# Patient Record
Sex: FEMALE | Employment: UNEMPLOYED | URBAN - METROPOLITAN AREA
[De-identification: names, ages, dates, MRNs, and addresses within clinical notes are randomized per-mention and may not be internally consistent; named-entity substitution may affect disease eponyms.]

---

## 2021-10-05 ENCOUNTER — INITIAL CONSULT (OUTPATIENT)
Dept: PAIN MANAGEMENT | Age: 71
End: 2021-10-05
Payer: MEDICARE

## 2021-10-05 VITALS — WEIGHT: 185 LBS | HEIGHT: 66 IN | TEMPERATURE: 97.6 F | BODY MASS INDEX: 29.73 KG/M2

## 2021-10-05 DIAGNOSIS — R06.83 SNORES: ICD-10-CM

## 2021-10-05 DIAGNOSIS — G89.29 CHRONIC LEFT-SIDED LOW BACK PAIN WITHOUT SCIATICA: Primary | ICD-10-CM

## 2021-10-05 DIAGNOSIS — M79.605 LEFT LEG PAIN: ICD-10-CM

## 2021-10-05 DIAGNOSIS — Z98.890 HX OF LUMBOSACRAL SPINE SURGERY: ICD-10-CM

## 2021-10-05 DIAGNOSIS — M46.1 SACROILIITIS (HCC): ICD-10-CM

## 2021-10-05 DIAGNOSIS — M54.50 CHRONIC LEFT-SIDED LOW BACK PAIN WITHOUT SCIATICA: Primary | ICD-10-CM

## 2021-10-05 DIAGNOSIS — M99.04 SOMATIC DYSFUNCTION OF SACROILIAC JOINT: ICD-10-CM

## 2021-10-05 PROCEDURE — 99204 OFFICE O/P NEW MOD 45 MIN: CPT | Performed by: PHYSICAL MEDICINE & REHABILITATION

## 2021-10-05 PROCEDURE — G8419 CALC BMI OUT NRM PARAM NOF/U: HCPCS | Performed by: PHYSICAL MEDICINE & REHABILITATION

## 2021-10-05 PROCEDURE — 3017F COLORECTAL CA SCREEN DOC REV: CPT | Performed by: PHYSICAL MEDICINE & REHABILITATION

## 2021-10-05 PROCEDURE — G8484 FLU IMMUNIZE NO ADMIN: HCPCS | Performed by: PHYSICAL MEDICINE & REHABILITATION

## 2021-10-05 PROCEDURE — 1036F TOBACCO NON-USER: CPT | Performed by: PHYSICAL MEDICINE & REHABILITATION

## 2021-10-05 PROCEDURE — G8427 DOCREV CUR MEDS BY ELIG CLIN: HCPCS | Performed by: PHYSICAL MEDICINE & REHABILITATION

## 2021-10-05 PROCEDURE — 1123F ACP DISCUSS/DSCN MKR DOCD: CPT | Performed by: PHYSICAL MEDICINE & REHABILITATION

## 2021-10-05 PROCEDURE — 1090F PRES/ABSN URINE INCON ASSESS: CPT | Performed by: PHYSICAL MEDICINE & REHABILITATION

## 2021-10-05 PROCEDURE — G8400 PT W/DXA NO RESULTS DOC: HCPCS | Performed by: PHYSICAL MEDICINE & REHABILITATION

## 2021-10-05 PROCEDURE — 4040F PNEUMOC VAC/ADMIN/RCVD: CPT | Performed by: PHYSICAL MEDICINE & REHABILITATION

## 2021-10-05 RX ORDER — TIZANIDINE 2 MG/1
2 TABLET ORAL EVERY EVENING
Qty: 30 TABLET | Refills: 0 | Status: SHIPPED | OUTPATIENT
Start: 2021-10-05 | End: 2021-11-04

## 2021-10-05 RX ORDER — LIDOCAINE 40 MG/G
CREAM TOPICAL
Qty: 45 G | Refills: 0 | Status: SHIPPED | OUTPATIENT
Start: 2021-10-05

## 2021-10-05 RX ORDER — METHYLPREDNISOLONE 4 MG/1
TABLET ORAL
Qty: 1 KIT | Refills: 0 | Status: SHIPPED | OUTPATIENT
Start: 2021-10-05 | End: 2021-10-19

## 2021-10-05 ASSESSMENT — ENCOUNTER SYMPTOMS
DIARRHEA: 0
CONSTIPATION: 0
SHORTNESS OF BREATH: 0
BACK PAIN: 1
NAUSEA: 0

## 2021-10-05 NOTE — PROGRESS NOTES
Aubrie Muñoz  (1950)    10/5/2021    Subjective:     Aubrie Muñoz is 73 y.o. female who complains today of:    Chief Complaint   Patient presents with    Back Pain       Aubrie Muñoz is a 73 y.o. female who presents for evaluation by request of Dr. Dc Mcgraw for low back pain. She has struggled with pain for over 10 months. She denies any immediately-preceding traumatic or inciting events. She has been previously evaluated by Dr Dc Mcgraw whose records are reviewed below. She describes pain located in the left low back, buttock, and left leg. Pain is a constant ache and is currently a 7/10 and gets up to a 10/10 at its worst and goes down to a 6/10 at its best. Pain is worse with bending and walking. Pain is better with laying down. Pain is located 0% on the right and 100% on the left. Pain is located 80% in the back and buttock and 20% in the leg. She denies any numbness, tingling, weakness, bowel or bladder dysfunction, saddle anesthesia, falls, history of cancer, unexplained weight loss, persistent night pain and sweats, fever, IV drug abuse, immunocompromise, chronic prednisone or antibiotic use, or any other red flag symptoms. Mood is good, denies any suicidal or homicidal ideation. Sleep is good, awakes refreshed.     She has tried:  Home exercise program with minimal relief  Chiropractic treatment with Dr Dc Mcgraw with some relief  Lumbar spine surgery L5 with Dr Ck Aguilar 18 years ago    Diagnostic testing previously performed includes XRs    Medications tried include:  Acetaminophen with minimal relief for over 3 months  Ibuprofen with minimal relief for over 3 months    Allergies, Medications, Past Medical History, Family History, Social History, Work History, and Review of Systems reviewed below     +Stroke 18 years ago  +diabetes mellitus   +snores  +Anxiety     No Seizures, Epilepsy or Brain Surgery     Spends her time: retired, 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 \"[facility] is not all that we thought it would be. \"      Allergies:  Meperidine, Sulfamethoxazole-trimethoprim, Statins, and Meperidine hcl    Past Medical History:   Diagnosis Date    Diabetes mellitus ([facility]     Hypertension     Irritable bowel syndrome     Peripheral vascular disease ([facility]      Past Surgical History:   Procedure Laterality Date    CARPAL TUNNEL RELEASE      SHOULDER SURGERY      THYROID SURGERY       Family History   Problem Relation Age of Onset    Arthritis Mother     Heart Disease Mother     Hypertension Father     Stroke Father     Heart Disease Father      Social History     Socioeconomic History    Marital status: Unknown     Spouse name: Not on file    Number of children: Not on file    Years of education: Not on file    Highest education level: Not on file   Occupational History    Not on file   Tobacco Use    Smoking status: Former Smoker     Packs/day: 1.00     Years: 18.00     Pack years: 18.00     Quit date:      Years since quittin.7    Smokeless tobacco: Never Used   Substance and Sexual Activity    Alcohol use: Never    Drug use: Never    Sexual activity: Not on file   Other Topics Concern    Not on file   Social History Narrative    Not on file     Social Determinants of Health     Financial Resource Strain:     Difficulty of Paying Living Expenses:    Food Insecurity:     Worried About Running Out of Food in the Last Year:     [address] in the Last Year:    Transportation Needs:     Lack of Transportation (Medical):      Lack of Transportation (Non-Medical):    Physical Activity:     Days of Exercise per Week:     Minutes of Exercise per Session:    Stress:     Feeling of Stress :    Social Connections:     Frequency of Communication with Friends and Family:     Frequency of Social Gatherings with Friends and Family:     Attends Voodoo Services:     Active Member of Clubs or Organizations:     Attends Club or Organization Meetings:     Marital Status:    Intimate Partner Violence:     Fear of Current or Ex-Partner:     Emotionally Abused:     Physically Abused:     Sexually Abused:        No current outpatient medications on file prior to visit. No current facility-administered medications on file prior to visit. Review of Systems   Constitutional: Negative for fever. HENT: Negative for hearing loss. Respiratory: Negative for shortness of breath. Gastrointestinal: Negative for constipation, diarrhea and nausea. Genitourinary: Negative for difficulty urinating. Musculoskeletal: Positive for back pain. Negative for neck pain. Skin: Negative for rash. Neurological: Negative for headaches. Hematological: Does not bruise/bleed easily. Psychiatric/Behavioral: Negative for sleep disturbance. Objective:     Vitals:  Temp 97.6 °F (36.4 °C)   Ht 5' 6\" (1.676 m)   Wt 185 lb (83.9 kg)   BMI 29.86 kg/m² Pain Score:  10 - Worst pain ever      Exam performed under Coronavirus precautions  Gen: No acute distress  Neck: Grossly symmetric without any significant thyromegaly or masses appreciated. Eyes: No scleral icterus or lid lag appreciated bilaterally. Irises without gross defects bilaterally. HEENT: Hearing grossly intact bilaterally. Normocephalic, external ears and visible portions of nose and mouth atraumatic. Lymph: No gross neck or axillary lymphadenopathy  Cardio: No significant lower extremity edema, pulses intact without significant digit ischemia. Abd: No gross masses or large hernias appreciated. Skin: Visualized skin without any dermatomal rashes or sores. Palpation free of any tightening or subcutaneous nodules. MSK: Gait is antalgic. No significant upper limb digit ischemia appreciated.   Psych: Pleasant and cooperative with the history and exam. Mood and Affect normal. Appropriately dressed with good eye contact. Judgement and insight normal. Recent and remote memory intact. Alert and Oriented x3. Neuro: Cranial nerves II-XII grossly intact. No significant pathologic reflexes appreciated. Rises from a seated to standing position with mild difficulty. Gait is antalgic. No assistive devices used. Heel and toe walk intact. Lumbar flexion to 40 degrees, extension to 15 degrees. Limited lumbar spine range of motion. Rotation and extension reproduces axial low back pain. Other facet provocative maneuvers are positive. No gross step offs noted. Tenderness to palpation over the mid to low lumbar spinous processes and left lumbar paraspinals from L2 down to the sacrum. tenderness over left PSIS. No tenderness over bilateral greater trochanters. No tenderness over bilateral deep gluteal regions. Sensation grossly intact in both legs except for some left s1 paresthesias  Reflexes and strength functional for ambulation, no abnormal reflexes appreciated on exam today  Strength greater than 3/5 bilateral legs  Straight leg raise negative bilaterally. Positive thigh thrust, yoselin's and compression tests on the left. Outside record review:  Review of the original consultation request reveals no specific diagnostic requests or clinical concerns aside from pain management that require particular attention. There are no suggested, requested, or specified tests to be ordered or any prior diagnostics performed that require follow-up or further investigation.     Dr Izquierdo Medicus notes not avail for review at time of consultation    Labs 3/23/21:  Creatinine 1, elevated  Platelet 464 normal on 11/21/19      Family history of alcohol abuse +1 father alcohol abuse  Family history of illegal drug abuse 0  Family history of prescription drug abuse 0    Personal history of alcohol abuse/DUI 0  Personal history of illegal drug abuse 0  Personal history of prescription drug abuse 0    Age between 16-45 0    History of preadolescent sexual abuse 0    Personal history of obsessive compulsive disorder 0  Personal history of attention deficit disorder 0  Personal history of bipolar disorder 0  Personal history of schizophrenia 0  Personal history of depression 0    Score = 1, low risk    Assessment:      Diagnosis Orders   1. Chronic left-sided low back pain without sciatica  XR LUMBAR SPINE (2-3 VIEWS)    Amb External Referral To Physical Therapy    lidocaine (LMX) 4 % cream    methylPREDNISolone (MEDROL, FELIPE,) 4 MG tablet    tiZANidine (ZANAFLEX) 2 MG tablet   2. Snores  Amb External Referral To Pulmonology   3. Somatic dysfunction of sacroiliac joint  LA INJECT SI JOINT ARTHRGRPHY&/ANES/STEROID W/IMAGE   4. Sacroiliitis (HCC)  LA INJECT SI JOINT ARTHRGRPHY&/ANES/STEROID W/IMAGE   5. Left leg pain  EMG   6. Hx of lumbosacral spine surgery         Plan:     Periodic Controlled Substance Monitoring: Assessed functional status. Thalia Demarco MD)    Orders Placed This Encounter   Medications    lidocaine (LMX) 4 % cream     Sig: Apply a half dollar sized amount to intact skin topically up to twice daily as needed for pain     Dispense:  45 g     Refill:  0    methylPREDNISolone (MEDROL, FELIPE,) 4 MG tablet     Sig: Take by mouth. Avoid Ibuprofen and other NSAIDs until 5 days after completing this medicine.      Dispense:  1 kit     Refill:  0    tiZANidine (ZANAFLEX) 2 MG tablet     Sig: Take 1 tablet by mouth every evening As needed for pain and spasms     Dispense:  30 tablet     Refill:  0       Orders Placed This Encounter   Procedures    XR LUMBAR SPINE (2-3 VIEWS)     Standing Status:   Future     Standing Expiration Date:   10/5/2022     Order Specific Question:   Reason for exam:     Answer:   Please evaluate for the presence of lumbar facet arthropathy    Amb External Referral To Pulmonology     Referral Priority:   Routine     Referral Reason:   Specialty Services Required     Requested Specialty:   Pulmonology     Number of Visits Requested:   1    Amb External Referral To Physical Therapy     Referral Priority:   Routine     Referral Type:   Consult for Advice and Opinion     Referral Reason:   Specialty Services Required     Requested Specialty:   Physical Therapy     Number of Visits Requested:   1    EMG     Standing Status:   Future     Standing Expiration Date:   1/3/2022     Order Specific Question:   Which body part? Answer:   Bilateral lower extremities    MI INJECT SI JOINT ARTHRGRPHY&/ANES/STEROID W/IMAGE     -Left SI joint injections under XR with Dr Erik Manning. No anticoagulation, no antibiotics, +diabetes mellitus, no osteoporosis, no bleeding or platelet dysfunction, IV Dye okay, allergies reviewed, 15 minute procedure. C     Standing Status:   Future     Standing Expiration Date:   1/3/2022       -PT for low back pain  -XR LS Spine AP LAT to evaluate for lumbar facet arthropathy  -EMG B LE eval left leg pain  -Consideration for MRI LS Spine without contrast may be given if her symptoms do not improve with conservative treatment  -Refer to Podiatry for diabetic foot care. Refer to [facility] for snoring eval KAROL  -Lidocaine 4% ointment topical BID prn #1 tube one refill start 10/5/2021   -Medrol dose pack #1 kit no ref start 10/5/2021 Avoid all other NSAIDs and steroids. Advised of blood sugar increase. Benefit of pain relief outweigh risks of elevated blood sugars  -Consideration for Gabapentin may be given  -Start Tizanidine 2 mg by mouth every evening #30 no refills start 10/5/2021. Avoid all other muscle relaxers and/or sedating medicines. -Consideration for opioids may be given  -Left SI joint injections under XR with Dr Erik Manning. No anticoagulation, no antibiotics, +diabetes mellitus, no osteoporosis, no bleeding or platelet dysfunction, IV Dye okay, allergies reviewed, 15 minute procedure. Consider 3 mg betamethasone.    Benefit of pain relief outweigh risks of elevated blood sugars       Controlled Substance Monitoring:    Acute and Chronic Pain Monitoring:   RX Monitoring 10/5/2021   Periodic Controlled Substance Monitoring Assessed functional status. Discussed the risks, side effects, and symptoms that would warrant urgent or emergent physician evaluation of all medications prescribed today. The patient was advised that NSAID-type medications have three important potential side effects: gastrointestinal irritation including hemorrhage, myocardial injury including possible infarction, and kidney injury. She was asked to take the medication with food, avoid any other NSAIDs or steroids, and discontinue if she develops any concerning symptoms. She should immediately stop the medication and proceed to the emergency department for chest pain, dark urine or difficulty with producing urine, vomiting, abdominal pain or black/tarry stools. The patient expresses understanding of these issues and all questions were answered. Discussed the risks of the above recommended procedures including but not limited to bleeding, infection, worsened pain, damage to surrounding structures, side effects, toxicity, allergic reactions to medications used, immune and stress-response dysfunction, fat necrosis, skin pigmentation changes, blood sugar elevation, headache, vision changes, need for surgery, as well as catastrophic injury such as vision loss, paralysis, stroke, spinal cord and/or plexus infarction or injury, intrathecal injection, spinal cord puncture, arachnoiditis, discitis, bowel or bladder incontinence, ventilator dependence, loss of use of the arms and/or legs, and death. Discussed the risks, benefits, alternative procedures, and alternatives to the procedure including no procedure at all. Discussed that we cannot undo any permanent neurologic damage or change the course of any underlying disease.  After thorough discussion, patient expressed understanding and willingness to proceed. Provided education and counseling regarding the diagnosis, prognosis, and treatment options. All questions were answered. Encouraged her to follow-up with her primary care physician and/or specialists as required for her overall health and management of her comorbidities as well as any new positive symptoms mentioned in review of systems above. Care was provided within the definitions and limitations of our specialty practice. Encouraged lifestyle interventions including healthy habits, lifestyle changes, regular aerobic exercise and appropriate weight maintenance as advised by their primary care physician or cardiovascular health provider. Discussed well care and disease prevention/maintenance. Anatomic spine model was used to illustrate pathology. All recommendations for therapy are provided to improve function with activities of daily living, decrease pain, and help develop an exercise program. All recommendations for medications are meant to help decrease pain, improve function with activities of daily living, maintain compliance with home exercise program, and improve quality of life. All recommendations for therapeutic injections are meant to help decrease pain, improve function with activities of daily living, maintain compliance with home exercise program, improve quality of life, and decrease reliance upon oral medications. Encouraged compliance with her home exercise program. Recommended compliance with physical therapy program as outlined above. Discussed the elevated risks of excessive sedation while on pain medications. Advised her against driving or operating heavy machinery or performing any activities where she may harm herself or others while on pain medications. Particular caution was emphasized especially during dose adjustments and medication changes.  Discussed the elevated risks of respiratory depression and death while on opioid medications, especially when combined with other sedative substances. Discussed the risks of temporary disability, permanent disability, morbidity, and mortality with poorly-managed or undiagnosed medical conditions and comorbidities. Emphasized the importance of timely medical evaluation and treatment as previously recommended by us or other medical professionals. Risks of not pursing these recommendations were emphasized. The patient was offered a treatment at our facility. The physician and patient have discussed in detail the risk of exposure to and/or potential harm posed by the COVID-19 virus with having a office visits and procedures at this time versus the risk of delaying the visits and procedures. It is not possible to know either the risk of delaying the visits or procedure or chance of getting an infection with perfect accuracy, but a joint decision was made between the patient and the physician to proceed at this time with the scheduled visits and procedures. Advised her that any lab testing, imaging, or other diagnostic test results are best discussed in person in the office so that we can provide a clear explanation of their significance and best treatment based upon these results. It is her responsibility to make and keep a follow up appointment to discuss these test results in person to discuss the significance of the findings and appropriate follow-up steps. She expressed complete understanding and agreement with the entire plan as outlined above. Portions of this note may have been typed, auto-populated, dictated or transcribed by voice recognition resulting in errors, omissions, or close substitutions which may be missed despite careful proofreading. Please contact the author for any questions or concerns. Thank you Dr. Anny Blood for the opportunity to participate in this patient's care. If you have any questions or concerns, please do not hesitate to contact us.        Follow up:  Return in about 1 month (around 11/5/2021) for reassessment of pain and symptoms, EMG Internal, P.T. External Ref.     Annabella Hutchison MD

## 2021-10-06 ENCOUNTER — TELEPHONE (OUTPATIENT)
Dept: PAIN MANAGEMENT | Age: 71
End: 2021-10-06

## 2021-10-06 NOTE — TELEPHONE ENCOUNTER
LEFT SI JOINT INJECTION    NO AUTH REQUIRED    OK to schedule procedure approved as above. Please note sides/levels approved and date range. (If applicable, sides/levels approved may differ from those ordered)    TO BE SCHEDULED WITH DR. Loida Bah

## 2021-10-07 ENCOUNTER — PROCEDURE VISIT (OUTPATIENT)
Dept: PAIN MANAGEMENT | Age: 71
End: 2021-10-07
Payer: MEDICARE

## 2021-10-07 DIAGNOSIS — M99.04 SOMATIC DYSFUNCTION OF SACROILIAC JOINT: ICD-10-CM

## 2021-10-07 DIAGNOSIS — M46.1 SACROILIITIS (HCC): ICD-10-CM

## 2021-10-07 PROCEDURE — 27096 INJECT SACROILIAC JOINT: CPT | Performed by: PHYSICAL MEDICINE & REHABILITATION

## 2021-10-07 RX ORDER — LIDOCAINE HYDROCHLORIDE 10 MG/ML
5 INJECTION, SOLUTION EPIDURAL; INFILTRATION; INTRACAUDAL; PERINEURAL ONCE
Status: COMPLETED | OUTPATIENT
Start: 2021-10-07 | End: 2021-10-07

## 2021-10-07 RX ORDER — BETAMETHASONE SODIUM PHOSPHATE AND BETAMETHASONE ACETATE 3; 3 MG/ML; MG/ML
3 INJECTION, SUSPENSION INTRA-ARTICULAR; INTRALESIONAL; INTRAMUSCULAR; SOFT TISSUE ONCE
Status: COMPLETED | OUTPATIENT
Start: 2021-10-07 | End: 2021-10-07

## 2021-10-07 RX ADMIN — BETAMETHASONE SODIUM PHOSPHATE AND BETAMETHASONE ACETATE 3 MG: 3; 3 INJECTION, SUSPENSION INTRA-ARTICULAR; INTRALESIONAL; INTRAMUSCULAR; SOFT TISSUE at 15:00

## 2021-10-07 RX ADMIN — Medication 0.5 MEQ: at 15:00

## 2021-10-07 RX ADMIN — LIDOCAINE HYDROCHLORIDE 5 ML: 10 INJECTION, SOLUTION EPIDURAL; INFILTRATION; INTRACAUDAL; PERINEURAL at 15:00

## 2021-10-07 NOTE — PROGRESS NOTES
SACROILIAC (SI) JOINT INJECTION      Patient Name: Pablo Kendall  : 1950     Date:  10/7/2021      Physician: Sana Dunne MD     Pablo Kendall is here today for interventional pain management. Preoperatively, the patient presents with symptoms and physical exam findings consistent with sacroiliac (SI) joint-mediated pain. She has had persistent pain that limits her function and activities of daily living. The pain is persistent despite conservative measures. She has significant functional and psychological impairment due to this condition. Given her symptoms, physical exam findings, impairment in activities of daily living, and lack of response to conservative measures, consideration for SI joint corticosteroid injections was given. Discussed the risks of the procedure including, but not limited to, bleeding, infection, worsened pain, damage to surrounding structures, side effects, toxicity, allergic reactions to medications used, immune and stress-response dysfunction, fat necrosis, avascular necrosis, skin pigmentation changes, blood sugar elevation, headache, vision changes, need for surgery, as well as catastrophic injury such as vision loss, paralysis, stroke, bowel or bladder puncture, bowel or bladder incontinence, incontinence, loss of use of the legs, ventilator dependence, and death. Discussed the risks, benefits, alternative procedures, and alternatives to the procedure including no procedure at all. Discussed that we cannot undo any permanent neurologic damage or change the course of any underlying disease. After thorough discussion, patient expressed understanding and willingness to proceed. Written consent was obtained and is in the chart. Verbal consent to proceed was obtained. Standard ASIPP guidelines were followed and sterile technique used. Area was cleaned with Betadine three times. Informed consent was obtained. Fluoroscopic guidance was used for this procedure.     S.I. JOINT:

## 2021-10-08 DIAGNOSIS — G89.29 CHRONIC LEFT-SIDED LOW BACK PAIN WITHOUT SCIATICA: ICD-10-CM

## 2021-10-08 DIAGNOSIS — M54.50 CHRONIC LEFT-SIDED LOW BACK PAIN WITHOUT SCIATICA: ICD-10-CM

## 2021-10-15 ENCOUNTER — TELEPHONE (OUTPATIENT)
Dept: PAIN MANAGEMENT | Age: 71
End: 2021-10-15

## 2021-10-15 DIAGNOSIS — M79.605 LEFT LEG PAIN: Primary | ICD-10-CM

## 2021-10-15 NOTE — TELEPHONE ENCOUNTER
Patient called stating that she is in significant pain. The hip injection that she received has not helped her,sola has the prednisone. She is taking 3 aleve and claims that that barely takes the edge off. Patient states that the only relief she feels is while lying down. She also states that she can barely walk. Patient is requesting XR results and an order for an MRI as she feels that \"something isn't right\" for the amount of pain that she is feeling. Will Dr Erika Rodriguez please advise?

## 2021-10-15 NOTE — TELEPHONE ENCOUNTER
For severe pain and difficulty walking, recommend evaluation at ER. Patient had SI joint injection, not hip injection. Would need to evaluate her before ordering MRI to determine best location of MRI. Also, an MRI ordered by our office would need to get insurance approval and scheduled at facility. I have ordered XR of hip if patient would like to complete.

## 2021-10-15 NOTE — TELEPHONE ENCOUNTER
[facility] Patient truly appreciates the information. She may go to the ER. Also, patient really thought that the injection she received was in her hip because the SI joint injection was so close to her hip and she appreciates the XR order for the hip. Patient will call if further assistance is needed before her upcoming scheduled appointments.

## 2023-01-26 PROBLEM — L25.9 CONTACT DERMATITIS: Status: ACTIVE | Noted: 2023-01-26

## 2023-01-26 PROBLEM — F32.A DEPRESSION: Status: ACTIVE | Noted: 2023-01-26

## 2023-01-26 PROBLEM — M81.0 POSTMENOPAUSAL OSTEOPOROSIS: Status: ACTIVE | Noted: 2023-01-26

## 2023-01-26 PROBLEM — R13.10 DYSPHAGIA: Status: ACTIVE | Noted: 2023-01-26

## 2023-01-26 PROBLEM — J31.0 CHRONIC RHINITIS: Status: ACTIVE | Noted: 2023-01-26

## 2023-01-26 PROBLEM — H93.19 TINNITUS: Status: ACTIVE | Noted: 2023-01-26

## 2023-01-26 PROBLEM — R05.9 COUGH: Status: ACTIVE | Noted: 2023-01-26

## 2023-01-26 PROBLEM — K57.30 DIVERTICULOSIS OF COLON: Status: ACTIVE | Noted: 2023-01-26

## 2023-01-26 PROBLEM — H81.10 BPPV (BENIGN PAROXYSMAL POSITIONAL VERTIGO): Status: ACTIVE | Noted: 2023-01-26

## 2023-01-26 PROBLEM — M67.431 GANGLION OF RIGHT WRIST: Status: ACTIVE | Noted: 2023-01-26

## 2023-01-26 PROBLEM — E83.41 HYPERMAGNESEMIA: Status: ACTIVE | Noted: 2023-01-26

## 2023-01-26 PROBLEM — E11.51 CONTROLLED DIABETES MELLITUS WITH PERIPHERAL CIRCULATORY DISORDER (MULTI): Status: ACTIVE | Noted: 2023-01-26

## 2023-01-26 PROBLEM — E11.9 TYPE 2 DIABETES MELLITUS WITHOUT COMPLICATION, WITHOUT LONG-TERM CURRENT USE OF INSULIN (MULTI): Status: ACTIVE | Noted: 2023-01-26

## 2023-01-26 PROBLEM — E78.2 HYPERLIPIDEMIA, MIXED: Status: ACTIVE | Noted: 2023-01-26

## 2023-01-26 PROBLEM — R53.83 FATIGUE: Status: ACTIVE | Noted: 2023-01-26

## 2023-01-26 PROBLEM — F32.1 CURRENT MODERATE EPISODE OF MAJOR DEPRESSIVE DISORDER (MULTI): Status: ACTIVE | Noted: 2023-01-26

## 2023-01-26 PROBLEM — M54.31 SCIATICA OF RIGHT SIDE: Status: ACTIVE | Noted: 2023-01-26

## 2023-01-26 PROBLEM — Z86.16 HISTORY OF COVID-19: Status: ACTIVE | Noted: 2023-01-26

## 2023-01-26 PROBLEM — E11.9 NEW ONSET TYPE 2 DIABETES MELLITUS (MULTI): Status: ACTIVE | Noted: 2023-01-26

## 2023-01-26 PROBLEM — I51.9 DIASTOLIC DYSFUNCTION, LEFT VENTRICLE: Status: ACTIVE | Noted: 2023-01-26

## 2023-01-26 PROBLEM — I73.9 PAD (PERIPHERAL ARTERY DISEASE) (CMS-HCC): Status: ACTIVE | Noted: 2023-01-26

## 2023-01-26 PROBLEM — E86.0 DEHYDRATION: Status: ACTIVE | Noted: 2023-01-26

## 2023-01-26 PROBLEM — E83.52 HYPERCALCEMIA: Status: ACTIVE | Noted: 2023-01-26

## 2023-01-26 PROBLEM — M25.50 JOINT PAIN: Status: ACTIVE | Noted: 2023-01-26

## 2023-01-26 PROBLEM — I10 HYPERTENSION, ESSENTIAL: Status: ACTIVE | Noted: 2023-01-26

## 2023-01-26 PROBLEM — M19.049 CMC ARTHRITIS: Status: ACTIVE | Noted: 2023-01-26

## 2023-01-26 PROBLEM — G57.00 PIRIFORMIS SYNDROME: Status: ACTIVE | Noted: 2023-01-26

## 2023-01-26 PROBLEM — E03.9 HYPOTHYROIDISM, ADULT: Status: ACTIVE | Noted: 2023-01-26

## 2023-01-26 PROBLEM — J30.0 VASOMOTOR RHINITIS: Status: ACTIVE | Noted: 2023-01-26

## 2023-01-26 PROBLEM — E53.8 VITAMIN B12 DEFICIENCY: Status: ACTIVE | Noted: 2023-01-26

## 2023-01-26 PROBLEM — I65.29 STENOSIS OF CAROTID ARTERY: Status: ACTIVE | Noted: 2023-01-26

## 2023-01-26 PROBLEM — F41.9 ANXIETY: Status: ACTIVE | Noted: 2023-01-26

## 2023-01-26 PROBLEM — M54.2 NECK PAIN: Status: ACTIVE | Noted: 2023-01-26

## 2023-01-26 PROBLEM — E55.9 VITAMIN D DEFICIENCY: Status: ACTIVE | Noted: 2023-01-26

## 2023-01-26 PROBLEM — R13.10 ODYNOPHAGIA: Status: ACTIVE | Noted: 2023-01-26

## 2023-01-26 PROBLEM — N18.31 CHRONIC KIDNEY DISEASE, STAGE 3A (MULTI): Status: ACTIVE | Noted: 2023-01-26

## 2023-01-26 PROBLEM — R42 VERTIGO: Status: ACTIVE | Noted: 2023-01-26

## 2023-01-26 PROBLEM — J30.9 ALLERGIC RHINITIS: Status: ACTIVE | Noted: 2023-01-26

## 2023-01-26 PROBLEM — N18.30 STAGE 3 CHRONIC KIDNEY DISEASE (MULTI): Status: ACTIVE | Noted: 2023-01-26

## 2023-01-26 PROBLEM — M54.50 LOWER BACK PAIN: Status: ACTIVE | Noted: 2023-01-26

## 2023-01-26 PROBLEM — J32.9 CHRONIC SINUSITIS: Status: ACTIVE | Noted: 2023-01-26

## 2023-01-26 PROBLEM — I77.1 SUBCLAVIAN ARTERY STENOSIS (CMS-HCC): Status: ACTIVE | Noted: 2023-01-26

## 2023-01-26 PROBLEM — M85.80 OSTEOPENIA: Status: ACTIVE | Noted: 2023-01-26

## 2023-01-26 PROBLEM — K22.4 ESOPHAGEAL DYSMOTILITY: Status: ACTIVE | Noted: 2023-01-26

## 2023-01-26 PROBLEM — R07.81 RIB PAIN ON LEFT SIDE: Status: ACTIVE | Noted: 2023-01-26

## 2023-01-26 RX ORDER — LANCETS 33 GAUGE
33 EACH MISCELLANEOUS 3 TIMES DAILY
COMMUNITY

## 2023-01-26 RX ORDER — ASPIRIN 81 MG/1
81 TABLET ORAL DAILY
COMMUNITY
Start: 2018-08-28

## 2023-01-26 RX ORDER — BLOOD-GLUCOSE METER
EACH MISCELLANEOUS
COMMUNITY
Start: 2022-09-01

## 2023-01-26 RX ORDER — OMEPRAZOLE 40 MG/1
40 CAPSULE, DELAYED RELEASE ORAL DAILY
COMMUNITY
Start: 2018-08-28 | End: 2023-06-07 | Stop reason: SDUPTHER

## 2023-01-26 RX ORDER — KETOROLAC TROMETHAMINE 10 MG/1
10 TABLET, FILM COATED ORAL EVERY 4 HOURS PRN
COMMUNITY
Start: 2021-11-11 | End: 2023-03-09 | Stop reason: ALTCHOICE

## 2023-01-26 RX ORDER — CYCLOBENZAPRINE HCL 10 MG
10 TABLET ORAL EVERY 8 HOURS PRN
COMMUNITY
Start: 2022-05-12 | End: 2023-03-09 | Stop reason: ALTCHOICE

## 2023-01-26 RX ORDER — ACETAMINOPHEN 500 MG
100 TABLET ORAL
COMMUNITY
Start: 2018-08-28

## 2023-01-26 RX ORDER — BUSPIRONE HYDROCHLORIDE 15 MG/1
15 TABLET ORAL 2 TIMES DAILY
COMMUNITY
Start: 2019-06-13 | End: 2023-12-14 | Stop reason: SDUPTHER

## 2023-01-26 RX ORDER — ATORVASTATIN CALCIUM 40 MG/1
40 TABLET, FILM COATED ORAL DAILY
COMMUNITY
Start: 2018-01-12

## 2023-01-26 RX ORDER — LEVOTHYROXINE SODIUM 112 UG/1
112 TABLET ORAL
COMMUNITY
Start: 2018-08-28 | End: 2023-09-18 | Stop reason: SDUPTHER

## 2023-01-26 RX ORDER — ALBUTEROL SULFATE 90 UG/1
1-2 AEROSOL, METERED RESPIRATORY (INHALATION)
COMMUNITY
Start: 2019-07-23

## 2023-01-26 RX ORDER — CARVEDILOL 3.12 MG/1
3.12 TABLET ORAL 2 TIMES DAILY
COMMUNITY
Start: 2021-07-20 | End: 2023-03-09

## 2023-01-26 RX ORDER — EPINEPHRINE 0.22MG
100 AEROSOL WITH ADAPTER (ML) INHALATION
COMMUNITY
Start: 2020-03-03

## 2023-01-26 RX ORDER — NEOMYCIN/POLYMYXIN B/PRAMOXINE 3.5-10K-1
300 CREAM (GRAM) TOPICAL
COMMUNITY
Start: 2021-01-13

## 2023-01-26 RX ORDER — IPRATROPIUM BROMIDE 21 UG/1
2 SPRAY, METERED NASAL 2 TIMES DAILY
COMMUNITY
Start: 2022-02-07

## 2023-01-26 RX ORDER — MECLIZINE HYDROCHLORIDE 25 MG/1
25 TABLET ORAL 3 TIMES DAILY PRN
COMMUNITY
Start: 2020-03-03 | End: 2023-07-03 | Stop reason: SDUPTHER

## 2023-01-26 RX ORDER — AMLODIPINE BESYLATE 5 MG/1
5 TABLET ORAL DAILY
COMMUNITY
Start: 2019-06-10

## 2023-01-26 RX ORDER — CITALOPRAM 20 MG/1
30 TABLET, FILM COATED ORAL DAILY
COMMUNITY
Start: 2020-03-03 | End: 2023-11-20 | Stop reason: SDUPTHER

## 2023-03-09 ENCOUNTER — OFFICE VISIT (OUTPATIENT)
Dept: PRIMARY CARE | Facility: CLINIC | Age: 73
End: 2023-03-09
Payer: MEDICARE

## 2023-03-09 VITALS
HEIGHT: 66 IN | HEART RATE: 52 BPM | TEMPERATURE: 97.4 F | OXYGEN SATURATION: 99 % | RESPIRATION RATE: 16 BRPM | BODY MASS INDEX: 28.28 KG/M2 | DIASTOLIC BLOOD PRESSURE: 70 MMHG | SYSTOLIC BLOOD PRESSURE: 122 MMHG | WEIGHT: 176 LBS

## 2023-03-09 DIAGNOSIS — E11.9 TYPE 2 DIABETES MELLITUS WITHOUT COMPLICATION, UNSPECIFIED WHETHER LONG TERM INSULIN USE (MULTI): ICD-10-CM

## 2023-03-09 DIAGNOSIS — E53.8 VITAMIN B12 DEFICIENCY: ICD-10-CM

## 2023-03-09 DIAGNOSIS — N18.31 CHRONIC KIDNEY DISEASE, STAGE 3A (MULTI): ICD-10-CM

## 2023-03-09 DIAGNOSIS — I67.2 CEREBRAL ATHEROSCLEROSIS: ICD-10-CM

## 2023-03-09 DIAGNOSIS — I63.133: ICD-10-CM

## 2023-03-09 DIAGNOSIS — E11.9 TYPE 2 DIABETES MELLITUS WITHOUT COMPLICATION, WITHOUT LONG-TERM CURRENT USE OF INSULIN (MULTI): ICD-10-CM

## 2023-03-09 DIAGNOSIS — E78.2 HYPERLIPIDEMIA, MIXED: ICD-10-CM

## 2023-03-09 DIAGNOSIS — N18.31 STAGE 3A CHRONIC KIDNEY DISEASE (MULTI): ICD-10-CM

## 2023-03-09 DIAGNOSIS — E03.9 HYPOTHYROIDISM, ADULT: ICD-10-CM

## 2023-03-09 DIAGNOSIS — E11.51 CONTROLLED DIABETES MELLITUS WITH PERIPHERAL CIRCULATORY DISORDER (MULTI): ICD-10-CM

## 2023-03-09 DIAGNOSIS — F32.1 CURRENT MODERATE EPISODE OF MAJOR DEPRESSIVE DISORDER WITHOUT PRIOR EPISODE (MULTI): ICD-10-CM

## 2023-03-09 DIAGNOSIS — I77.1 SUBCLAVIAN ARTERY STENOSIS (CMS-HCC): ICD-10-CM

## 2023-03-09 DIAGNOSIS — I10 HYPERTENSION, ESSENTIAL: Primary | ICD-10-CM

## 2023-03-09 PROBLEM — H93.19 TINNITUS: Status: RESOLVED | Noted: 2023-01-26 | Resolved: 2023-03-09

## 2023-03-09 PROBLEM — R42 VERTIGO: Status: RESOLVED | Noted: 2023-01-26 | Resolved: 2023-03-09

## 2023-03-09 PROBLEM — M85.80 OSTEOPENIA: Status: RESOLVED | Noted: 2023-01-26 | Resolved: 2023-03-09

## 2023-03-09 PROBLEM — R05.9 COUGH: Status: RESOLVED | Noted: 2023-01-26 | Resolved: 2023-03-09

## 2023-03-09 PROBLEM — M25.50 JOINT PAIN: Status: RESOLVED | Noted: 2023-01-26 | Resolved: 2023-03-09

## 2023-03-09 PROBLEM — L25.9 CONTACT DERMATITIS: Status: RESOLVED | Noted: 2023-01-26 | Resolved: 2023-03-09

## 2023-03-09 PROBLEM — R13.10 DYSPHAGIA: Status: RESOLVED | Noted: 2023-01-26 | Resolved: 2023-03-09

## 2023-03-09 PROBLEM — M67.431 GANGLION OF RIGHT WRIST: Status: RESOLVED | Noted: 2023-01-26 | Resolved: 2023-03-09

## 2023-03-09 PROBLEM — J32.9 CHRONIC SINUSITIS: Status: RESOLVED | Noted: 2023-01-26 | Resolved: 2023-03-09

## 2023-03-09 PROBLEM — R53.83 FATIGUE: Status: RESOLVED | Noted: 2023-01-26 | Resolved: 2023-03-09

## 2023-03-09 PROBLEM — E86.0 DEHYDRATION: Status: RESOLVED | Noted: 2023-01-26 | Resolved: 2023-03-09

## 2023-03-09 PROBLEM — J31.0 CHRONIC RHINITIS: Status: RESOLVED | Noted: 2023-01-26 | Resolved: 2023-03-09

## 2023-03-09 PROBLEM — R13.10 ODYNOPHAGIA: Status: RESOLVED | Noted: 2023-01-26 | Resolved: 2023-03-09

## 2023-03-09 PROBLEM — M54.2 NECK PAIN: Status: RESOLVED | Noted: 2023-01-26 | Resolved: 2023-03-09

## 2023-03-09 PROBLEM — F32.A DEPRESSION: Status: RESOLVED | Noted: 2023-01-26 | Resolved: 2023-03-09

## 2023-03-09 PROBLEM — E83.41 HYPERMAGNESEMIA: Status: RESOLVED | Noted: 2023-01-26 | Resolved: 2023-03-09

## 2023-03-09 PROBLEM — G57.00 PIRIFORMIS SYNDROME: Status: RESOLVED | Noted: 2023-01-26 | Resolved: 2023-03-09

## 2023-03-09 PROBLEM — J30.0 VASOMOTOR RHINITIS: Status: RESOLVED | Noted: 2023-01-26 | Resolved: 2023-03-09

## 2023-03-09 PROBLEM — M54.31 SCIATICA OF RIGHT SIDE: Status: RESOLVED | Noted: 2023-01-26 | Resolved: 2023-03-09

## 2023-03-09 PROBLEM — K57.30 DIVERTICULOSIS OF COLON: Status: RESOLVED | Noted: 2023-01-26 | Resolved: 2023-03-09

## 2023-03-09 PROBLEM — M54.50 LOWER BACK PAIN: Status: RESOLVED | Noted: 2023-01-26 | Resolved: 2023-03-09

## 2023-03-09 PROBLEM — R07.81 RIB PAIN ON LEFT SIDE: Status: RESOLVED | Noted: 2023-01-26 | Resolved: 2023-03-09

## 2023-03-09 PROCEDURE — 3074F SYST BP LT 130 MM HG: CPT | Performed by: INTERNAL MEDICINE

## 2023-03-09 PROCEDURE — 1036F TOBACCO NON-USER: CPT | Performed by: INTERNAL MEDICINE

## 2023-03-09 PROCEDURE — 1159F MED LIST DOCD IN RCRD: CPT | Performed by: INTERNAL MEDICINE

## 2023-03-09 PROCEDURE — 1160F RVW MEDS BY RX/DR IN RCRD: CPT | Performed by: INTERNAL MEDICINE

## 2023-03-09 PROCEDURE — 3078F DIAST BP <80 MM HG: CPT | Performed by: INTERNAL MEDICINE

## 2023-03-09 PROCEDURE — 99214 OFFICE O/P EST MOD 30 MIN: CPT | Performed by: INTERNAL MEDICINE

## 2023-03-09 PROCEDURE — 4010F ACE/ARB THERAPY RXD/TAKEN: CPT | Performed by: INTERNAL MEDICINE

## 2023-03-09 RX ORDER — CARVEDILOL 6.25 MG/1
1 TABLET ORAL 2 TIMES DAILY
COMMUNITY
Start: 2023-02-03

## 2023-03-09 ASSESSMENT — PATIENT HEALTH QUESTIONNAIRE - PHQ9
SUM OF ALL RESPONSES TO PHQ9 QUESTIONS 1 AND 2: 0
2. FEELING DOWN, DEPRESSED OR HOPELESS: NOT AT ALL
1. LITTLE INTEREST OR PLEASURE IN DOING THINGS: NOT AT ALL

## 2023-03-09 ASSESSMENT — ENCOUNTER SYMPTOMS
FATIGUE: 0
SHORTNESS OF BREATH: 0
COUGH: 0
FEVER: 0

## 2023-03-09 NOTE — PROGRESS NOTES
"Subjective   Gwendolyn Puente is a 72 y.o. female who presents for Diabetes.    HPI   Monitoring glucose QAM.  Ave: 100-130  Is not currently following Diabetic diet.  Denies changes in neuropathy pain, polyuria, polydipsia, vision changes.    Reports increase in BP.  Sees Cardiology.  Coreg increased last month.  Denies any decrease in BP since increasing med.  C/o head pressure.  Denies chest pain/SOB/BLE edema.    C/o bilat hand aching pain in AM and swelling.  Reports worsening bilat shoulder pain.  Scheduled for R rotator cuff repair 3/22/23.  Review of Systems   Constitutional:  Negative for fatigue and fever.   Respiratory:  Negative for cough and shortness of breath.    Cardiovascular:  Negative for chest pain and leg swelling.   All other systems reviewed and are negative.      Health Maintenance Due   Topic Date Due    Yearly Adult Physical  Never done    Colorectal Cancer Screening  Never done    Pneumococcal Vaccine: 65+ Years (1 - PCV) Never done    DTaP/Tdap/Td Vaccines (1 - Tdap) Never done    Diabetes: Foot Exam  Never done    Diabetes: Retinopathy Screening  Never done    Hepatitis C Screening  Never done    Zoster Vaccines (1 of 2) Never done    Influenza Vaccine (1) 09/01/2022    Diabetes: Hemoglobin A1C  02/16/2023       Objective   /70   Pulse 52   Temp 36.3 °C (97.4 °F)   Resp 16   Ht 1.676 m (5' 6\")   Wt 79.8 kg (176 lb)   SpO2 99%   BMI 28.41 kg/m²     Physical Exam  Vitals and nursing note reviewed.   Constitutional:       Appearance: Normal appearance.   HENT:      Head: Normocephalic.   Eyes:      Conjunctiva/sclera: Conjunctivae normal.      Pupils: Pupils are equal, round, and reactive to light.   Cardiovascular:      Rate and Rhythm: Normal rate and regular rhythm.      Pulses: Normal pulses.      Heart sounds: Normal heart sounds.   Pulmonary:      Effort: Pulmonary effort is normal.      Breath sounds: Normal breath sounds.   Musculoskeletal:         General: No swelling.    "   Cervical back: Neck supple.   Skin:     General: Skin is warm and dry.   Neurological:      General: No focal deficit present.      Mental Status: She is oriented to person, place, and time.         Assessment/Plan   Problem List Items Addressed This Visit          Circulatory    Controlled diabetes mellitus with peripheral circulatory disorder (CMS/[facility])    Hypertension, essential - Primary    Subclavian artery stenosis (CMS/[facility])       Genitourinary    Stage 3 chronic kidney disease (CMS/[facility])    RESOLVED: Chronic kidney disease, stage 3a    Relevant Orders    APTT    Protime-INR       Endocrine/Metabolic    Hypothyroidism, adult    Type 2 diabetes mellitus without complication, without long-term current use of insulin (CMS/[facility])    Vitamin B12 deficiency       Other    Current moderate episode of major depressive disorder (CMS/[facility])    Hyperlipidemia, mixed     Other Visit Diagnoses       Type 2 diabetes mellitus without complication, unspecified whether long term insulin use (CMS/[facility])        Relevant Medications    SITagliptin phosphate (Januvia) 100 mg tablet    Other Relevant Orders    Follow Up In [facility] - PCP    Lipid Panel    CBC    Comprehensive Metabolic Panel    Hemoglobin A1C    Vitamin B12    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  Continue established treatment plan and follow up at least yearly.  Cont meds  Check labs  Fu in 3 months  Risk acceptable for rotator cuff surgery 3/23/23

## 2023-03-15 ENCOUNTER — LAB (OUTPATIENT)
Dept: LAB | Facility: LAB | Age: 73
End: 2023-03-15
Payer: MEDICARE

## 2023-03-15 DIAGNOSIS — E11.9 TYPE 2 DIABETES MELLITUS WITHOUT COMPLICATION, UNSPECIFIED WHETHER LONG TERM INSULIN USE (MULTI): ICD-10-CM

## 2023-03-15 DIAGNOSIS — I67.2 CEREBRAL ATHEROSCLEROSIS: ICD-10-CM

## 2023-03-15 DIAGNOSIS — I63.133: ICD-10-CM

## 2023-03-15 DIAGNOSIS — N18.31 CHRONIC KIDNEY DISEASE, STAGE 3A (MULTI): ICD-10-CM

## 2023-03-15 LAB
ACTIVATED PARTIAL THROMBOPLASTIN TIME IN PPP BY COAGULATION ASSAY: 32 SEC (ref 26–39)
ALANINE AMINOTRANSFERASE (SGPT) (U/L) IN SER/PLAS: 18 U/L (ref 7–45)
ALBUMIN (G/DL) IN SER/PLAS: 4.3 G/DL (ref 3.4–5)
ALKALINE PHOSPHATASE (U/L) IN SER/PLAS: 65 U/L (ref 33–136)
ANION GAP IN SER/PLAS: 11 MMOL/L (ref 10–20)
ASPARTATE AMINOTRANSFERASE (SGOT) (U/L) IN SER/PLAS: 17 U/L (ref 9–39)
BILIRUBIN TOTAL (MG/DL) IN SER/PLAS: 1.1 MG/DL (ref 0–1.2)
CALCIUM (MG/DL) IN SER/PLAS: 9.8 MG/DL (ref 8.6–10.3)
CARBON DIOXIDE, TOTAL (MMOL/L) IN SER/PLAS: 30 MMOL/L (ref 21–32)
CHLORIDE (MMOL/L) IN SER/PLAS: 101 MMOL/L (ref 98–107)
CHOLESTEROL (MG/DL) IN SER/PLAS: 149 MG/DL (ref 0–199)
CHOLESTEROL IN HDL (MG/DL) IN SER/PLAS: 33.9 MG/DL
CHOLESTEROL/HDL RATIO: 4.4
COBALAMIN (VITAMIN B12) (PG/ML) IN SER/PLAS: 260 PG/ML (ref 211–911)
CREATININE (MG/DL) IN SER/PLAS: 1.01 MG/DL (ref 0.5–1.05)
ERYTHROCYTE DISTRIBUTION WIDTH (RATIO) BY AUTOMATED COUNT: 13 % (ref 11.5–14.5)
ERYTHROCYTE MEAN CORPUSCULAR HEMOGLOBIN CONCENTRATION (G/DL) BY AUTOMATED: 32.3 G/DL (ref 32–36)
ERYTHROCYTE MEAN CORPUSCULAR VOLUME (FL) BY AUTOMATED COUNT: 93 FL (ref 80–100)
ERYTHROCYTES (10*6/UL) IN BLOOD BY AUTOMATED COUNT: 4.68 X10E12/L (ref 4–5.2)
GFR FEMALE: 60 ML/MIN/1.73M2
GLUCOSE (MG/DL) IN SER/PLAS: 90 MG/DL (ref 74–99)
HEMATOCRIT (%) IN BLOOD BY AUTOMATED COUNT: 43.6 % (ref 36–46)
HEMOGLOBIN (G/DL) IN BLOOD: 14.1 G/DL (ref 12–16)
INR IN PPP BY COAGULATION ASSAY: 1.1 (ref 0.9–1.1)
LDL: 88 MG/DL (ref 0–99)
LEUKOCYTES (10*3/UL) IN BLOOD BY AUTOMATED COUNT: 8.2 X10E9/L (ref 4.4–11.3)
PLATELETS (10*3/UL) IN BLOOD AUTOMATED COUNT: 270 X10E9/L (ref 150–450)
POTASSIUM (MMOL/L) IN SER/PLAS: 4.6 MMOL/L (ref 3.5–5.3)
PROTEIN TOTAL: 6.9 G/DL (ref 6.4–8.2)
PROTHROMBIN TIME (PT) IN PPP BY COAGULATION ASSAY: 12.5 SEC (ref 9.8–13.4)
SODIUM (MMOL/L) IN SER/PLAS: 137 MMOL/L (ref 136–145)
TRIGLYCERIDE (MG/DL) IN SER/PLAS: 134 MG/DL (ref 0–149)
UREA NITROGEN (MG/DL) IN SER/PLAS: 18 MG/DL (ref 6–23)
VLDL: 27 MG/DL (ref 0–40)

## 2023-03-15 PROCEDURE — 83036 HEMOGLOBIN GLYCOSYLATED A1C: CPT

## 2023-03-15 PROCEDURE — 85027 COMPLETE CBC AUTOMATED: CPT

## 2023-03-15 PROCEDURE — 85730 THROMBOPLASTIN TIME PARTIAL: CPT

## 2023-03-15 PROCEDURE — 85610 PROTHROMBIN TIME: CPT

## 2023-03-15 PROCEDURE — 80053 COMPREHEN METABOLIC PANEL: CPT

## 2023-03-15 PROCEDURE — 82607 VITAMIN B-12: CPT

## 2023-03-15 PROCEDURE — 80061 LIPID PANEL: CPT

## 2023-03-15 PROCEDURE — 36415 COLL VENOUS BLD VENIPUNCTURE: CPT

## 2023-03-16 ENCOUNTER — TELEPHONE (OUTPATIENT)
Dept: PRIMARY CARE | Facility: CLINIC | Age: 73
End: 2023-03-16
Payer: MEDICARE

## 2023-03-16 LAB
ESTIMATED AVERAGE GLUCOSE FOR HBA1C: 140 MG/DL
HEMOGLOBIN A1C/HEMOGLOBIN TOTAL IN BLOOD: 6.5 %

## 2023-03-16 NOTE — TELEPHONE ENCOUNTER
----- Message from Thea Estes DO sent at 3/16/2023  9:47 AM EDT -----  Call patient her labs look very good  Hbaic is 6.5   Rest looks good  Keep up the good work

## 2023-06-07 ENCOUNTER — TELEPHONE (OUTPATIENT)
Dept: PRIMARY CARE | Facility: CLINIC | Age: 73
End: 2023-06-07
Payer: MEDICARE

## 2023-06-07 DIAGNOSIS — K22.4 ESOPHAGEAL DYSMOTILITY: ICD-10-CM

## 2023-06-07 RX ORDER — OMEPRAZOLE 40 MG/1
40 CAPSULE, DELAYED RELEASE ORAL DAILY
Qty: 90 CAPSULE | Refills: 3 | Status: SHIPPED | OUTPATIENT
Start: 2023-06-07 | End: 2024-06-06

## 2023-06-13 ENCOUNTER — OFFICE VISIT (OUTPATIENT)
Dept: PRIMARY CARE | Facility: CLINIC | Age: 73
End: 2023-06-13
Payer: MEDICARE

## 2023-06-13 VITALS
WEIGHT: 173 LBS | HEART RATE: 68 BPM | SYSTOLIC BLOOD PRESSURE: 126 MMHG | OXYGEN SATURATION: 97 % | DIASTOLIC BLOOD PRESSURE: 72 MMHG | BODY MASS INDEX: 27.8 KG/M2 | HEIGHT: 66 IN | RESPIRATION RATE: 16 BRPM | TEMPERATURE: 98.3 F

## 2023-06-13 DIAGNOSIS — E11.51 CONTROLLED DIABETES MELLITUS WITH PERIPHERAL CIRCULATORY DISORDER (MULTI): ICD-10-CM

## 2023-06-13 DIAGNOSIS — E11.9 TYPE 2 DIABETES MELLITUS WITHOUT COMPLICATION, UNSPECIFIED WHETHER LONG TERM INSULIN USE (MULTI): ICD-10-CM

## 2023-06-13 DIAGNOSIS — N18.31 STAGE 3A CHRONIC KIDNEY DISEASE (MULTI): ICD-10-CM

## 2023-06-13 DIAGNOSIS — E78.2 HYPERLIPIDEMIA, MIXED: ICD-10-CM

## 2023-06-13 DIAGNOSIS — E03.9 HYPOTHYROIDISM, ADULT: ICD-10-CM

## 2023-06-13 DIAGNOSIS — I10 HYPERTENSION, ESSENTIAL: Primary | ICD-10-CM

## 2023-06-13 PROBLEM — Z86.16 HISTORY OF COVID-19: Status: RESOLVED | Noted: 2023-01-26 | Resolved: 2023-06-13

## 2023-06-13 PROBLEM — M19.049 CMC ARTHRITIS: Status: RESOLVED | Noted: 2023-01-26 | Resolved: 2023-06-13

## 2023-06-13 PROCEDURE — 99214 OFFICE O/P EST MOD 30 MIN: CPT | Performed by: INTERNAL MEDICINE

## 2023-06-13 PROCEDURE — 4010F ACE/ARB THERAPY RXD/TAKEN: CPT | Performed by: INTERNAL MEDICINE

## 2023-06-13 PROCEDURE — 1036F TOBACCO NON-USER: CPT | Performed by: INTERNAL MEDICINE

## 2023-06-13 PROCEDURE — 1160F RVW MEDS BY RX/DR IN RCRD: CPT | Performed by: INTERNAL MEDICINE

## 2023-06-13 PROCEDURE — 3074F SYST BP LT 130 MM HG: CPT | Performed by: INTERNAL MEDICINE

## 2023-06-13 PROCEDURE — 3078F DIAST BP <80 MM HG: CPT | Performed by: INTERNAL MEDICINE

## 2023-06-13 PROCEDURE — 1159F MED LIST DOCD IN RCRD: CPT | Performed by: INTERNAL MEDICINE

## 2023-06-13 PROCEDURE — 3044F HG A1C LEVEL LT 7.0%: CPT | Performed by: INTERNAL MEDICINE

## 2023-06-13 ASSESSMENT — PATIENT HEALTH QUESTIONNAIRE - PHQ9
1. LITTLE INTEREST OR PLEASURE IN DOING THINGS: NOT AT ALL
2. FEELING DOWN, DEPRESSED OR HOPELESS: NOT AT ALL
SUM OF ALL RESPONSES TO PHQ9 QUESTIONS 1 AND 2: 0

## 2023-06-13 ASSESSMENT — ENCOUNTER SYMPTOMS
FEVER: 0
SHORTNESS OF BREATH: 0
FATIGUE: 0
COUGH: 0

## 2023-06-13 ASSESSMENT — PAIN SCALES - GENERAL: PAINLEVEL: 8

## 2023-06-13 NOTE — PROGRESS NOTES
"0Subjective   Gwendolyn Puente is a 73 y.o. female who presents for 3 month Diabetes and Hypertension follow up.    HPI   Monitoring glucose daily.  Sugars \"all over\".  High as 145, low as 110. 123 this AM.  Wants to stop Januvia d/t cost. Fell in \"donut hole\".   Denies adverse sx's r/t DM.  Does not follow Diabetic diet.    C/o increase in fatigue.  Not sleeping well at night.    Had recent rotator cuff surgery.  Went through PT.  Pain continued when laying down.  MRI done.  Has to have follow up surgery.    Review of Systems   Constitutional:  Negative for fatigue and fever.   Respiratory:  Negative for cough and shortness of breath.    Cardiovascular:  Negative for chest pain and leg swelling.   All other systems reviewed and are negative.      Health Maintenance Due   Topic Date Due    Medicare Annual Wellness Visit (AWV)  Never done    Colorectal Cancer Screening  Never done    Pneumococcal Vaccine: 65+ Years (1 - PCV) Never done    Diabetes: Foot Exam  Never done    Diabetes: Retinopathy Screening  Never done    Hepatitis C Screening  Never done    DTaP/Tdap/Td Vaccines (1 - Tdap) Never done    Zoster Vaccines (1 of 2) Never done    COVID-19 Vaccine (5 - Booster for Moderna series) 02/08/2022    Diabetes: Hemoglobin A1C  06/15/2023       Objective   /72   Pulse 68   Temp 36.8 °C (98.3 °F)   Resp 16   Ht 1.676 m (5' 6\")   Wt 78.5 kg (173 lb)   SpO2 97%   BMI 27.92 kg/m²     Physical Exam  Vitals and nursing note reviewed.   Constitutional:       Appearance: Normal appearance.   HENT:      Head: Normocephalic.   Eyes:      Conjunctiva/sclera: Conjunctivae normal.      Pupils: Pupils are equal, round, and reactive to light.   Neck:      Vascular: Carotid bruit present.   Cardiovascular:      Rate and Rhythm: Normal rate and regular rhythm.      Pulses: Normal pulses.      Heart sounds: Normal heart sounds.   Pulmonary:      Effort: Pulmonary effort is normal.      Breath sounds: Normal breath sounds. "   Musculoskeletal:         General: No swelling.      Cervical back: Neck supple.   Skin:     General: Skin is warm and dry.   Neurological:      General: No focal deficit present.      Mental Status: She is oriented to person, place, and time.         Assessment/Plan   Problem List Items Addressed This Visit       Controlled diabetes mellitus with peripheral circulatory disorder (CMS/[facility])    Hyperlipidemia, mixed    Hypertension, essential - Primary    Hypothyroidism, adult    Stage 3 chronic kidney disease (CMS/[facility])     Other Visit Diagnoses       Type 2 diabetes mellitus without complication, unspecified whether long term insulin use (CMS/[facility])        Relevant Orders    Lipid Panel    CBC    Comprehensive Metabolic Panel    Hemoglobin A1C    Vitamin B12          Stop januvia due to cost  Metformin 500 every day  Check labs  Cont meds   Stable based on symptoms and exam.  Continue established treatment plan and follow up at least yearly.       Saw vascular in jan 23 for carotid

## 2023-07-03 ENCOUNTER — TELEPHONE (OUTPATIENT)
Dept: PRIMARY CARE | Facility: CLINIC | Age: 73
End: 2023-07-03
Payer: MEDICARE

## 2023-07-03 DIAGNOSIS — H81.10 BENIGN PAROXYSMAL POSITIONAL VERTIGO, UNSPECIFIED LATERALITY: Primary | ICD-10-CM

## 2023-07-03 RX ORDER — MECLIZINE HYDROCHLORIDE 25 MG/1
25 TABLET ORAL 3 TIMES DAILY PRN
Qty: 30 TABLET | Refills: 0 | Status: SHIPPED | OUTPATIENT
Start: 2023-07-03 | End: 2023-07-17 | Stop reason: SINTOL

## 2023-07-03 NOTE — TELEPHONE ENCOUNTER
Having dizzy spells like crazy, she can't even move. She would like the Antivert. She would like a call back at [phone]. Sent to the [facility] in [place].

## 2023-07-10 ENCOUNTER — LAB (OUTPATIENT)
Dept: LAB | Facility: LAB | Age: 73
End: 2023-07-10
Payer: MEDICARE

## 2023-07-10 DIAGNOSIS — E11.9 TYPE 2 DIABETES MELLITUS WITHOUT COMPLICATION, UNSPECIFIED WHETHER LONG TERM INSULIN USE (MULTI): ICD-10-CM

## 2023-07-10 LAB
ALANINE AMINOTRANSFERASE (SGPT) (U/L) IN SER/PLAS: 21 U/L (ref 7–45)
ALBUMIN (G/DL) IN SER/PLAS: 4.3 G/DL (ref 3.4–5)
ALKALINE PHOSPHATASE (U/L) IN SER/PLAS: 84 U/L (ref 33–136)
ANION GAP IN SER/PLAS: 12 MMOL/L (ref 10–20)
ASPARTATE AMINOTRANSFERASE (SGOT) (U/L) IN SER/PLAS: 15 U/L (ref 9–39)
BILIRUBIN TOTAL (MG/DL) IN SER/PLAS: 1.2 MG/DL (ref 0–1.2)
CALCIUM (MG/DL) IN SER/PLAS: 10.3 MG/DL (ref 8.6–10.3)
CARBON DIOXIDE, TOTAL (MMOL/L) IN SER/PLAS: 29 MMOL/L (ref 21–32)
CHLORIDE (MMOL/L) IN SER/PLAS: 103 MMOL/L (ref 98–107)
CHOLESTEROL (MG/DL) IN SER/PLAS: 138 MG/DL (ref 0–199)
CHOLESTEROL IN HDL (MG/DL) IN SER/PLAS: 35.8 MG/DL
CHOLESTEROL/HDL RATIO: 3.9
COBALAMIN (VITAMIN B12) (PG/ML) IN SER/PLAS: 271 PG/ML (ref 211–911)
CREATININE (MG/DL) IN SER/PLAS: 1 MG/DL (ref 0.5–1.05)
ERYTHROCYTE DISTRIBUTION WIDTH (RATIO) BY AUTOMATED COUNT: 14.3 % (ref 11.5–14.5)
ERYTHROCYTE MEAN CORPUSCULAR HEMOGLOBIN CONCENTRATION (G/DL) BY AUTOMATED: 32.2 G/DL (ref 32–36)
ERYTHROCYTE MEAN CORPUSCULAR VOLUME (FL) BY AUTOMATED COUNT: 95 FL (ref 80–100)
ERYTHROCYTES (10*6/UL) IN BLOOD BY AUTOMATED COUNT: 4.63 X10E12/L (ref 4–5.2)
ESTIMATED AVERAGE GLUCOSE FOR HBA1C: 131 MG/DL
GFR FEMALE: 60 ML/MIN/1.73M2
GLUCOSE (MG/DL) IN SER/PLAS: 125 MG/DL (ref 74–99)
HEMATOCRIT (%) IN BLOOD BY AUTOMATED COUNT: 43.8 % (ref 36–46)
HEMOGLOBIN (G/DL) IN BLOOD: 14.1 G/DL (ref 12–16)
HEMOGLOBIN A1C/HEMOGLOBIN TOTAL IN BLOOD: 6.2 %
LDL: 79 MG/DL (ref 0–99)
LEUKOCYTES (10*3/UL) IN BLOOD BY AUTOMATED COUNT: 6.9 X10E9/L (ref 4.4–11.3)
PLATELETS (10*3/UL) IN BLOOD AUTOMATED COUNT: 300 X10E9/L (ref 150–450)
POTASSIUM (MMOL/L) IN SER/PLAS: 4.9 MMOL/L (ref 3.5–5.3)
PROTEIN TOTAL: 6.6 G/DL (ref 6.4–8.2)
SODIUM (MMOL/L) IN SER/PLAS: 139 MMOL/L (ref 136–145)
TRIGLYCERIDE (MG/DL) IN SER/PLAS: 114 MG/DL (ref 0–149)
UREA NITROGEN (MG/DL) IN SER/PLAS: 13 MG/DL (ref 6–23)
VLDL: 23 MG/DL (ref 0–40)

## 2023-07-10 PROCEDURE — 80061 LIPID PANEL: CPT

## 2023-07-10 PROCEDURE — 36415 COLL VENOUS BLD VENIPUNCTURE: CPT

## 2023-07-10 PROCEDURE — 85027 COMPLETE CBC AUTOMATED: CPT

## 2023-07-10 PROCEDURE — 82607 VITAMIN B-12: CPT

## 2023-07-10 PROCEDURE — 80053 COMPREHEN METABOLIC PANEL: CPT

## 2023-07-10 PROCEDURE — 83036 HEMOGLOBIN GLYCOSYLATED A1C: CPT

## 2023-07-17 ENCOUNTER — OFFICE VISIT (OUTPATIENT)
Dept: PRIMARY CARE | Facility: CLINIC | Age: 73
End: 2023-07-17
Payer: MEDICARE

## 2023-07-17 VITALS
BODY MASS INDEX: 27.8 KG/M2 | DIASTOLIC BLOOD PRESSURE: 70 MMHG | HEIGHT: 66 IN | TEMPERATURE: 98 F | RESPIRATION RATE: 16 BRPM | HEART RATE: 80 BPM | SYSTOLIC BLOOD PRESSURE: 128 MMHG | OXYGEN SATURATION: 100 % | WEIGHT: 173 LBS

## 2023-07-17 DIAGNOSIS — M54.12 CERVICAL RADICULOPATHY: ICD-10-CM

## 2023-07-17 DIAGNOSIS — E11.9 TYPE 2 DIABETES MELLITUS WITHOUT COMPLICATION, WITHOUT LONG-TERM CURRENT USE OF INSULIN (MULTI): ICD-10-CM

## 2023-07-17 DIAGNOSIS — H81.10 BENIGN PAROXYSMAL POSITIONAL VERTIGO, UNSPECIFIED LATERALITY: ICD-10-CM

## 2023-07-17 DIAGNOSIS — D68.59 HYPERCOAGULABLE STATE (MULTI): Primary | ICD-10-CM

## 2023-07-17 PROCEDURE — 1159F MED LIST DOCD IN RCRD: CPT | Performed by: INTERNAL MEDICINE

## 2023-07-17 PROCEDURE — 1036F TOBACCO NON-USER: CPT | Performed by: INTERNAL MEDICINE

## 2023-07-17 PROCEDURE — 99214 OFFICE O/P EST MOD 30 MIN: CPT | Performed by: INTERNAL MEDICINE

## 2023-07-17 PROCEDURE — 3044F HG A1C LEVEL LT 7.0%: CPT | Performed by: INTERNAL MEDICINE

## 2023-07-17 PROCEDURE — 3074F SYST BP LT 130 MM HG: CPT | Performed by: INTERNAL MEDICINE

## 2023-07-17 PROCEDURE — 1125F AMNT PAIN NOTED PAIN PRSNT: CPT | Performed by: INTERNAL MEDICINE

## 2023-07-17 PROCEDURE — 3078F DIAST BP <80 MM HG: CPT | Performed by: INTERNAL MEDICINE

## 2023-07-17 PROCEDURE — 1160F RVW MEDS BY RX/DR IN RCRD: CPT | Performed by: INTERNAL MEDICINE

## 2023-07-17 RX ORDER — EZETIMIBE 10 MG/1
10 TABLET ORAL
COMMUNITY
Start: 2023-06-28

## 2023-07-17 ASSESSMENT — PATIENT HEALTH QUESTIONNAIRE - PHQ9
SUM OF ALL RESPONSES TO PHQ9 QUESTIONS 1 AND 2: 0
1. LITTLE INTEREST OR PLEASURE IN DOING THINGS: NOT AT ALL
2. FEELING DOWN, DEPRESSED OR HOPELESS: NOT AT ALL

## 2023-07-17 NOTE — PROGRESS NOTES
"Kanchan Puente is a 73 y.o. female who presents for Dizziness.    HPI   Pt reports increase in dizziness 7/3/23.  Caused nausea.   Meclizine Rx'd, per on call physician.  Meclizine effective.  Last dose this past Thursday.  C/o dizziness from change in position.    \"Equilibrium off\" when up and moving around.  Denies recent URI, chest pain/pressure, heart palps.  C/o \"pings\" to back of neck, neck cramping.      June 22 surgery  July 2 vertigo  Rolled over and dizzy   Same as in previous  Talked to service and got meclizine    Review of Systems    Health Maintenance Due   Topic Date Due    Colorectal Cancer Screening  Never done    Pneumococcal Vaccine: 65+ Years (1 - PCV) Never done    Diabetes: Foot Exam  Never done    Hepatitis C Screening  Never done    Zoster Vaccines (1 of 2) Never done    DTaP/Tdap/Td Vaccines (2 - Td or Tdap) 12/02/2018    COVID-19 Vaccine (5 - Booster for Moderna series) 02/08/2022    Diabetes: Retinopathy Screening  03/29/2023       Objective   /70   Pulse 80   Temp 36.7 °C (98 °F)   Resp 16   Ht 1.676 m (5' 6\")   Wt 78.5 kg (173 lb)   SpO2 100%   BMI 27.92 kg/m²     Physical Exam    Assessment/Plan   Problem List Items Addressed This Visit       BPPV (benign paroxysmal positional vertigo)    Relevant Medications    meclizine (Antivert) 25 mg tablet    methylPREDNISolone (Medrol Dospak) 4 mg tablets    diazePAM (Valium) 5 mg tablet    Type 2 diabetes mellitus without complication, without long-term current use of insulin (CMS/HCC)    Hypercoagulable state (CMS/HCC) - Primary     Other Visit Diagnoses       Cervical radiculopathy            Will monitor sugar  Low dose steroid  Meclizine   And diazepam as back up   Mild improvement in symptoms   Call on wed with update as pt is traveling on sat         "

## 2023-07-18 RX ORDER — METHYLPREDNISOLONE 4 MG/1
TABLET ORAL
Qty: 21 TABLET | Refills: 0 | Status: SHIPPED | OUTPATIENT
Start: 2023-07-18 | End: 2023-07-24

## 2023-07-18 RX ORDER — DIAZEPAM 5 MG/1
5 TABLET ORAL 3 TIMES DAILY PRN
Qty: 15 TABLET | Refills: 0 | Status: SHIPPED | OUTPATIENT
Start: 2023-07-18 | End: 2023-11-20

## 2023-07-18 RX ORDER — MECLIZINE HYDROCHLORIDE 25 MG/1
25 TABLET ORAL 3 TIMES DAILY PRN
Qty: 90 TABLET | Refills: 1 | Status: SHIPPED | OUTPATIENT
Start: 2023-07-18 | End: 2024-07-17

## 2023-08-07 ENCOUNTER — DOCUMENTATION (OUTPATIENT)
Dept: PRIMARY CARE | Facility: CLINIC | Age: 73
End: 2023-08-07
Payer: MEDICARE

## 2023-08-07 ENCOUNTER — PATIENT OUTREACH (OUTPATIENT)
Dept: CARE COORDINATION | Facility: CLINIC | Age: 73
End: 2023-08-07
Payer: MEDICARE

## 2023-08-07 RX ORDER — LIDOCAINE 560 MG/1
1 PATCH PERCUTANEOUS; TOPICAL; TRANSDERMAL DAILY
COMMUNITY

## 2023-08-07 RX ORDER — POLYETHYLENE GLYCOL 3350 17 G/17G
17 POWDER, FOR SOLUTION ORAL DAILY
COMMUNITY

## 2023-08-07 RX ORDER — ACETAMINOPHEN 500 MG
1000 TABLET ORAL EVERY 8 HOURS PRN
COMMUNITY

## 2023-08-07 RX ORDER — OXYCODONE HYDROCHLORIDE 5 MG/1
5 CAPSULE ORAL EVERY 6 HOURS PRN
COMMUNITY
End: 2023-08-16 | Stop reason: ALTCHOICE

## 2023-08-07 RX ORDER — DOCUSATE SODIUM 100 MG/1
100 CAPSULE, LIQUID FILLED ORAL 2 TIMES DAILY
COMMUNITY

## 2023-08-07 NOTE — PROGRESS NOTES
Discharge Facility: [facility]  Discharge Diagnosis: Lumbar compression fracture, fall  Admission Date: 8/3/2023  Discharge Date: 8/4/2023    PCP Appointment Date: 8/16/2023 1630    Specialist Appointment Date:   -8/30 Neurosurgery    Hospital Encounter and Summary: Linked     See discharge assessment below for further details    Medications  Medications reviewed with patient/caregiver?: Yes (new prescriptions reviewed- colace, miralax, lidocaine, oxycodone) (8/7/2023 10:45 AM)  Is the patient having any side effects they believe may be caused by any medication additions or changes?: No (8/7/2023 10:45 AM)  Does the patient have all medications ordered at discharge?: Yes (8/7/2023 10:45 AM)  Care Management Interventions: No intervention needed (8/7/2023 10:45 AM)  Is the patient taking all medications as directed (includes completed medication regime)?: Yes (8/7/2023 10:45 AM)    Appointments  Does the patient have a primary care provider?: Yes (8/7/2023 10:45 AM)  Care Management Interventions: Verified appointment date/time/provider (8/7/2023 10:45 AM)  Has the patient kept scheduled appointments due by today?: Yes (8/7/2023 10:45 AM)    Self Management  What is the home health agency?: [facility] [phone] ( states [facility] will be visiting at 12pm today) (8/7/2023 10:45 AM)  Has home health visited the patient within 72 hours of discharge?: Call prior to 72 hours (8/7/2023 10:45 AM)  What Durable Medical Equipment (DME) was ordered?: wheeled walker (8/7/2023 10:45 AM)  Has all Durable Medical Equipment (DME) been delivered?: Yes (8/7/2023 10:45 AM)    Patient Teaching  Does the patient have access to their discharge instructions?: Yes (8/7/2023 10:45 AM)  Care Management Interventions: Reviewed instructions with patient (8/7/2023 10:45 AM)  What is the patient's perception of their health status since discharge?: Same (8/7/2023 10:45 AM)  Is the patient/caregiver able to  teach back the hierarchy of who to call/visit for symptoms/problems? PCP, Specialist, Home Health nurse, Urgent Care, ED, 911: Yes (8/7/2023 10:45 AM)    Wrap Up  Wrap Up Additional Comments: Pt admitted to [facility] 8/3-8/4 after a fall resulting in a lumbar compression fracture. Pt has had vertigo. Pt discharged with [facility] agency is to come out today at noon. Pt has wheeled walker. Pt reports vertigo that she is taking antivert for. Pt reports pain being controlled by prescribed oxycodone 5mg PRN Q6hr and lidocaine patches. Pt is concerned that she only has 5 pills left and does not go for PCP follow up until 8/16. Will notify Dr. Estes. Pt has follow up with neuro surgery 8/30. Pt denies any other questions, needs, or concerns at this time. Pt encouraged to call if questions arise. (8/7/2023 10:45 AM)  Call End Time: 1053 (8/7/2023 10:45 AM)

## 2023-08-16 ENCOUNTER — OFFICE VISIT (OUTPATIENT)
Dept: PRIMARY CARE | Facility: CLINIC | Age: 73
End: 2023-08-16
Payer: MEDICARE

## 2023-08-16 VITALS
HEART RATE: 76 BPM | BODY MASS INDEX: 27.8 KG/M2 | DIASTOLIC BLOOD PRESSURE: 68 MMHG | OXYGEN SATURATION: 98 % | HEIGHT: 66 IN | TEMPERATURE: 98.6 F | RESPIRATION RATE: 16 BRPM | SYSTOLIC BLOOD PRESSURE: 136 MMHG | WEIGHT: 173 LBS

## 2023-08-16 DIAGNOSIS — H81.10 BENIGN PAROXYSMAL POSITIONAL VERTIGO, UNSPECIFIED LATERALITY: Primary | ICD-10-CM

## 2023-08-16 DIAGNOSIS — M24.819 INTERNAL DERANGEMENT OF SHOULDER, UNSPECIFIED LATERALITY: ICD-10-CM

## 2023-08-16 DIAGNOSIS — S32.010D COMPRESSION FRACTURE OF L1 VERTEBRA WITH ROUTINE HEALING, SUBSEQUENT ENCOUNTER: ICD-10-CM

## 2023-08-16 PROCEDURE — 3044F HG A1C LEVEL LT 7.0%: CPT | Performed by: INTERNAL MEDICINE

## 2023-08-16 PROCEDURE — 1125F AMNT PAIN NOTED PAIN PRSNT: CPT | Performed by: INTERNAL MEDICINE

## 2023-08-16 PROCEDURE — 1036F TOBACCO NON-USER: CPT | Performed by: INTERNAL MEDICINE

## 2023-08-16 PROCEDURE — 1159F MED LIST DOCD IN RCRD: CPT | Performed by: INTERNAL MEDICINE

## 2023-08-16 PROCEDURE — 3078F DIAST BP <80 MM HG: CPT | Performed by: INTERNAL MEDICINE

## 2023-08-16 PROCEDURE — 3075F SYST BP GE 130 - 139MM HG: CPT | Performed by: INTERNAL MEDICINE

## 2023-08-16 PROCEDURE — 99495 TRANSJ CARE MGMT MOD F2F 14D: CPT | Performed by: INTERNAL MEDICINE

## 2023-08-16 PROCEDURE — 1160F RVW MEDS BY RX/DR IN RCRD: CPT | Performed by: INTERNAL MEDICINE

## 2023-08-16 RX ORDER — OXYCODONE AND ACETAMINOPHEN 5; 325 MG/1; MG/1
1 TABLET ORAL EVERY 4 HOURS PRN
Qty: 24 TABLET | Refills: 0 | Status: SHIPPED | OUTPATIENT
Start: 2023-08-16 | End: 2023-08-21

## 2023-08-16 ASSESSMENT — ENCOUNTER SYMPTOMS
FEVER: 0
COUGH: 0
SHORTNESS OF BREATH: 0
FATIGUE: 0

## 2023-08-16 ASSESSMENT — PAIN SCALES - GENERAL: PAINLEVEL: 3

## 2023-08-16 NOTE — PROGRESS NOTES
"Subjective   Gwendolyn Puente is a 73 y.o. female who presents for Hospital Follow-up.    HPI   Hospitalized [facility] 8/3/23 - 8/4/23  Dx: Lumbar Compression fx  Labs, CT Brain, C-Spine, Abd/Pelvis, L Spine.  Med changes: Oxycodone 5 mg Q6H PRN, Tylenol 1000mg Q8H PRN, Lidocaine patch, Colace BID, Miralax every day PRN.  Referred to [facility] PT/OT.  Has not started yet.  Follow up: PCP, Neurosurgeon  Reports fall d/t vertigo.  Continues w/vertigo, pain.  Pain: 2-3.  Taking Tylenol, moderately effective.  Vertigo worse w/change in position from laying to sitting, sitting to laying.  Taking Meclizine BID.  Using walker to assist w/transfers in and out of bed.    Vertigo was doing well  Went on vacation  Was at home on 8/3 spinning and fell in kitchen  Having     Review of Systems   Constitutional:  Negative for fatigue and fever.   Respiratory:  Negative for cough and shortness of breath.    Cardiovascular:  Negative for chest pain and leg swelling.   All other systems reviewed and are negative.      Health Maintenance Due   Topic Date Due    Colorectal Cancer Screening  Never done    Pneumococcal Vaccine: 65+ Years (1 - PCV) Never done    Diabetes: Foot Exam  Never done    Hepatitis C Screening  Never done    Zoster Vaccines (1 of 2) Never done    DTaP/Tdap/Td Vaccines (2 - Td or Tdap) 12/02/2018    COVID-19 Vaccine (5 - Booster for Moderna series) 02/08/2022    Diabetes: Retinopathy Screening  03/29/2023    Diabetes: Hemoglobin A1C  10/10/2023       Objective   /68   Pulse 76   Temp 37 °C (98.6 °F)   Resp 16   Ht 1.676 m (5' 6\")   Wt 78.5 kg (173 lb)   SpO2 98%   BMI 27.92 kg/m²     Physical Exam  Vitals and nursing note reviewed.   Constitutional:       Appearance: Normal appearance.   HENT:      Head: Normocephalic.   Eyes:      Conjunctiva/sclera: Conjunctivae normal.      Pupils: Pupils are equal, round, and reactive to light.   Cardiovascular:      Rate and Rhythm: Normal rate and regular rhythm. "      Pulses: Normal pulses.      Heart sounds: Normal heart sounds.   Pulmonary:      Effort: Pulmonary effort is normal.      Breath sounds: Normal breath sounds.   Musculoskeletal:         General: No swelling.      Cervical back: Neck supple.   Skin:     General: Skin is warm and dry.   Neurological:      General: No focal deficit present.      Mental Status: She is oriented to person, place, and time.         Assessment/Plan   Problem List Items Addressed This Visit       BPPV (benign paroxysmal positional vertigo) - Primary    Relevant Orders    Referral to Physical Therapy    Referral to Physical Therapy     Other Visit Diagnoses       Compression fracture of L1 vertebra with routine healing, subsequent encounter        Relevant Medications    oxyCODONE-acetaminophen (Percocet) 5-325 mg tablet    Internal derangement of shoulder, unspecified laterality        Relevant Orders    Referral to Physical Therapy    Referral to Physical Therapy          Reviewed records  Resume pt  Vestibular therapy   Cont meds  Pain meds short term

## 2023-08-18 ENCOUNTER — PATIENT OUTREACH (OUTPATIENT)
Dept: CARE COORDINATION | Facility: CLINIC | Age: 73
End: 2023-08-18
Payer: MEDICARE

## 2023-08-18 NOTE — PROGRESS NOTES
Call regarding appt. with PCP on 8/16/2023 after hospitalization.  At time of outreach call the patient feels as if their condition has improved since last visit.  Pt states she does still have vertigo when getting up from a sitting or standing position, but not when she is walking / standing. Pt reports some improvement with pain and that she is cutting down on the oxycodone. Pt is going to physical therapy.  Reviewed the PCP appointment with the pt and addressed any questions or concerns.  Verified next PCP appt 9/18/2023 1100.  Pt declines any needs at this time.  Pt encouraged to call if questions arise.

## 2023-09-18 ENCOUNTER — OFFICE VISIT (OUTPATIENT)
Dept: PRIMARY CARE | Facility: CLINIC | Age: 73
End: 2023-09-18
Payer: MEDICARE

## 2023-09-18 VITALS
WEIGHT: 174 LBS | HEIGHT: 66 IN | SYSTOLIC BLOOD PRESSURE: 136 MMHG | TEMPERATURE: 98.2 F | RESPIRATION RATE: 16 BRPM | DIASTOLIC BLOOD PRESSURE: 68 MMHG | OXYGEN SATURATION: 100 % | HEART RATE: 68 BPM | BODY MASS INDEX: 27.97 KG/M2

## 2023-09-18 DIAGNOSIS — I10 HYPERTENSION, ESSENTIAL: ICD-10-CM

## 2023-09-18 DIAGNOSIS — E03.9 HYPOTHYROIDISM, ADULT: ICD-10-CM

## 2023-09-18 DIAGNOSIS — N18.31 STAGE 3A CHRONIC KIDNEY DISEASE (MULTI): ICD-10-CM

## 2023-09-18 DIAGNOSIS — E78.2 HYPERLIPIDEMIA, MIXED: ICD-10-CM

## 2023-09-18 DIAGNOSIS — S32.040D COMPRESSION FRACTURE OF L4 VERTEBRA WITH ROUTINE HEALING: ICD-10-CM

## 2023-09-18 DIAGNOSIS — E11.9 TYPE 2 DIABETES MELLITUS WITHOUT COMPLICATION, UNSPECIFIED WHETHER LONG TERM INSULIN USE (MULTI): Primary | ICD-10-CM

## 2023-09-18 DIAGNOSIS — I65.23 BILATERAL CAROTID ARTERY STENOSIS: ICD-10-CM

## 2023-09-18 DIAGNOSIS — Z12.11 SCREENING FOR COLON CANCER: ICD-10-CM

## 2023-09-18 DIAGNOSIS — H81.10 BENIGN PAROXYSMAL POSITIONAL VERTIGO, UNSPECIFIED LATERALITY: ICD-10-CM

## 2023-09-18 PROCEDURE — 3075F SYST BP GE 130 - 139MM HG: CPT | Performed by: INTERNAL MEDICINE

## 2023-09-18 PROCEDURE — 3044F HG A1C LEVEL LT 7.0%: CPT | Performed by: INTERNAL MEDICINE

## 2023-09-18 PROCEDURE — 1125F AMNT PAIN NOTED PAIN PRSNT: CPT | Performed by: INTERNAL MEDICINE

## 2023-09-18 PROCEDURE — 1159F MED LIST DOCD IN RCRD: CPT | Performed by: INTERNAL MEDICINE

## 2023-09-18 PROCEDURE — 3078F DIAST BP <80 MM HG: CPT | Performed by: INTERNAL MEDICINE

## 2023-09-18 PROCEDURE — 99214 OFFICE O/P EST MOD 30 MIN: CPT | Performed by: INTERNAL MEDICINE

## 2023-09-18 PROCEDURE — 1036F TOBACCO NON-USER: CPT | Performed by: INTERNAL MEDICINE

## 2023-09-18 PROCEDURE — 1160F RVW MEDS BY RX/DR IN RCRD: CPT | Performed by: INTERNAL MEDICINE

## 2023-09-18 RX ORDER — LEVOTHYROXINE SODIUM 112 UG/1
TABLET ORAL
Qty: 90 TABLET | Refills: 3 | Status: SHIPPED | OUTPATIENT
Start: 2023-09-18

## 2023-09-18 ASSESSMENT — PAIN SCALES - GENERAL: PAINLEVEL: 3

## 2023-09-18 NOTE — PROGRESS NOTES
"Kanchan Puente is a 73 y.o. female who presents for 3 month Diabetes follow up.    HPI   Is not monitoring routine glucose routinely.  Last check 124.  Denies adverse sx's r/t DM.  Taking meds as Rx'd.    Continues w/vertigo.  Had x2 recent episodes at home.  \"Wiped me out.\"  Going to Vestibular Therapy.    Back pain improving.    Review of Systems    Health Maintenance Due   Topic Date Due    Colorectal Cancer Screening  Never done    Pneumococcal Vaccine: 65+ Years (1 - PCV) Never done    Diabetes: Foot Exam  Never done    Hepatitis C Screening  Never done    Zoster Vaccines (1 of 2) Never done    DTaP/Tdap/Td Vaccines (2 - Td or Tdap) 12/02/2018    COVID-19 Vaccine (5 - Moderna series) 02/08/2022    Diabetes: Retinopathy Screening  03/29/2023    Influenza Vaccine (1) 09/01/2023    Diabetes: Hemoglobin A1C  10/10/2023    Medicare Annual Wellness Visit (AWV)  11/12/2023    TSH Level  11/16/2023       Objective   /68   Pulse 68   Temp 36.8 °C (98.2 °F)   Resp 16   Ht 1.676 m (5' 6\")   Wt 78.9 kg (174 lb)   SpO2 100%   BMI 28.08 kg/m²     Physical Exam    Assessment/Plan   Problem List Items Addressed This Visit       BPPV (benign paroxysmal positional vertigo)    Relevant Orders    Referral to Neurology    Referral to ENT    Hyperlipidemia, mixed    Hypertension, essential    Hypothyroidism, adult    Relevant Medications    levothyroxine (Synthroid, Levoxyl) 112 mcg tablet    Other Relevant Orders    Thyroid Stimulating Hormone    Stenosis of carotid artery    Stage 3 chronic kidney disease (CMS/HCC)    Compression fracture of L4 vertebra with routine healing     Other Visit Diagnoses       Type 2 diabetes mellitus without complication, unspecified whether long term insulin use (CMS/[facility])    -  Primary    Relevant Orders    Lipid Panel    CBC    Comprehensive Metabolic Panel    Hemoglobin A1C    Vitamin B12    Screening for colon cancer        Relevant Orders    Cologuard® colon cancer " screening          Cont meds  Check labs  Stable based on symptoms and exam.  Continue established treatment plan and follow up at least yearly.  Cont vestibular therapy  Refer to neuro and ent for eval if not improving   Fu in 3 months  Valium and meclizine

## 2023-09-19 ENCOUNTER — PATIENT OUTREACH (OUTPATIENT)
Dept: CARE COORDINATION | Facility: CLINIC | Age: 73
End: 2023-09-19
Payer: MEDICARE

## 2023-09-19 NOTE — PROGRESS NOTES
Call placed regarding one month post discharge follow up call.  At time of outreach call the patient feels as if their condition has improved slightly since initial visit with PCP or specialist.  Pt reports she is still having some pain. Pt has been doing physical therapy and reports she will be having her third session today.  Questions or concerns regarding recovery period addressed at this time.   Reviewed any PCP or specialists progress notes/labs/radiology reports if applicable and addressed any questions or concerns.  Pt aware of ENT and neurology referral. Pt informed of lab work orders.  Pt states that she is going on vacation next week and is concerned about back pain with the flight, will notify PCP. No other questions, needs, or concerns at this time. Pt encouraged to call if questions arise.

## 2023-10-09 ENCOUNTER — APPOINTMENT (OUTPATIENT)
Dept: OTOLARYNGOLOGY | Facility: CLINIC | Age: 73
End: 2023-10-09
Payer: MEDICARE

## 2023-10-27 ENCOUNTER — TELEPHONE (OUTPATIENT)
Dept: PRIMARY CARE | Facility: CLINIC | Age: 73
End: 2023-10-27
Payer: MEDICARE

## 2023-10-27 LAB — NONINV COLON CA DNA+OCC BLD SCRN STL QL: NEGATIVE

## 2023-10-27 NOTE — TELEPHONE ENCOUNTER
----- Message from Thea Estes DO sent at 10/27/2023  8:16 AM EDT -----  Call patient brigida is negative   Repeat screening in 3 years

## 2023-10-30 ENCOUNTER — PATIENT OUTREACH (OUTPATIENT)
Dept: CARE COORDINATION | Facility: CLINIC | Age: 73
End: 2023-10-30
Payer: MEDICARE

## 2023-10-30 NOTE — PROGRESS NOTES
"Call placed regarding 90 day post discharge follow up call.  At time of outreach call the patient feels as if their condition has remained the same since initial visit with PCP or specialist.  Pt reports that she is still having significant pain. Pt states she \"doesn't have soreness but feels back is not supported\". Pt reports pain in bilateral shoulders, neck, and along collar bone that is pretty consistent especially at night. Pt states pain is unrelieved by aleve and tylenol. Pt states things such as pushing a grocery cart aggravates pain.  Will update PCP.  Pt reports she has an appointment tomorrow for physical therapy evaluation.  Pt denies any questions, needs, or concerns at this time. Pt reports she does not need any medication refills at this time.    Verified next PCP appt 12/19/2023 1500.  Pt encouraged to call if any questions or needs arise.  "

## 2023-11-17 DIAGNOSIS — F32.9 MAJOR DEPRESSIVE DISORDER, SINGLE EPISODE, UNSPECIFIED: ICD-10-CM

## 2023-11-20 ENCOUNTER — OFFICE VISIT (OUTPATIENT)
Dept: PRIMARY CARE | Facility: CLINIC | Age: 73
End: 2023-11-20
Payer: MEDICARE

## 2023-11-20 VITALS
WEIGHT: 178 LBS | RESPIRATION RATE: 16 BRPM | HEART RATE: 72 BPM | HEIGHT: 66 IN | SYSTOLIC BLOOD PRESSURE: 126 MMHG | TEMPERATURE: 98.2 F | DIASTOLIC BLOOD PRESSURE: 68 MMHG | OXYGEN SATURATION: 99 % | BODY MASS INDEX: 28.61 KG/M2

## 2023-11-20 DIAGNOSIS — M50.30 DEGENERATION OF CERVICAL INTERVERTEBRAL DISC: ICD-10-CM

## 2023-11-20 DIAGNOSIS — G62.9 NEUROPATHY: ICD-10-CM

## 2023-11-20 DIAGNOSIS — E11.51 CONTROLLED DIABETES MELLITUS WITH PERIPHERAL CIRCULATORY DISORDER (MULTI): Primary | ICD-10-CM

## 2023-11-20 DIAGNOSIS — F41.9 ANXIETY: ICD-10-CM

## 2023-11-20 DIAGNOSIS — R53.1 GENERALIZED WEAKNESS: ICD-10-CM

## 2023-11-20 PROCEDURE — 1159F MED LIST DOCD IN RCRD: CPT | Performed by: INTERNAL MEDICINE

## 2023-11-20 PROCEDURE — 3074F SYST BP LT 130 MM HG: CPT | Performed by: INTERNAL MEDICINE

## 2023-11-20 PROCEDURE — 1036F TOBACCO NON-USER: CPT | Performed by: INTERNAL MEDICINE

## 2023-11-20 PROCEDURE — 1125F AMNT PAIN NOTED PAIN PRSNT: CPT | Performed by: INTERNAL MEDICINE

## 2023-11-20 PROCEDURE — 99214 OFFICE O/P EST MOD 30 MIN: CPT | Performed by: INTERNAL MEDICINE

## 2023-11-20 PROCEDURE — 3044F HG A1C LEVEL LT 7.0%: CPT | Performed by: INTERNAL MEDICINE

## 2023-11-20 PROCEDURE — 1160F RVW MEDS BY RX/DR IN RCRD: CPT | Performed by: INTERNAL MEDICINE

## 2023-11-20 PROCEDURE — 3078F DIAST BP <80 MM HG: CPT | Performed by: INTERNAL MEDICINE

## 2023-11-20 RX ORDER — CITALOPRAM 20 MG/1
30 TABLET, FILM COATED ORAL DAILY
Qty: 135 TABLET | Refills: 3 | Status: SHIPPED | OUTPATIENT
Start: 2023-11-20 | End: 2024-11-19

## 2023-11-20 ASSESSMENT — PAIN SCALES - GENERAL: PAINLEVEL: 1

## 2023-11-20 NOTE — PROGRESS NOTES
"Subjective   Gwendolyn Puente is a 73 y.o. female who presents for 3 month Diabetes and Hypertension follow up.    HPI   Monitoring glucose occasionally.  Most recently 98 this AM.  Started taking Berberine.  Increase in neuropathy to feet/toes.    C/o throbbing pain to L third finger.  No known trigger.    Reports increase in muscle weakness.  Causes difficulty w/ambulation.  Pain on waking up.  Bilat shoulders, BUE.  Going to PT weekly.    Has appt with neuro in January  Still having pain and weakness    Review of Systems   Constitutional:  Negative for fatigue and fever.   Respiratory:  Negative for cough and shortness of breath.    Cardiovascular:  Negative for chest pain and leg swelling.   All other systems reviewed and are negative.      Health Maintenance Due   Topic Date Due    Medicare Annual Wellness Visit (AWV)  Never done    Pneumococcal Vaccine: 65+ Years (1 - PCV) Never done    Diabetes: Foot Exam  Never done    Hepatitis C Screening  Never done    Zoster Vaccines (1 of 2) Never done    DTaP/Tdap/Td Vaccines (2 - Td or Tdap) 12/02/2018    COVID-19 Vaccine (5 - Moderna series) 02/08/2022    Diabetes: Retinopathy Screening  03/29/2023    Influenza Vaccine (1) 09/01/2023    Diabetes: Hemoglobin A1C  10/10/2023    TSH Level  11/16/2023    Diabetes: Urine Protein Screening  11/16/2023    Bone Density Scan  12/13/2023    Mammogram  12/20/2023       Objective   /68   Pulse 72   Temp 36.8 °C (98.2 °F)   Resp 16   Ht 1.676 m (5' 6\")   Wt 80.7 kg (178 lb)   SpO2 99%   BMI 28.73 kg/m²     Physical Exam  Vitals and nursing note reviewed.   Constitutional:       Appearance: Normal appearance.   HENT:      Head: Normocephalic.   Eyes:      Conjunctiva/sclera: Conjunctivae normal.      Pupils: Pupils are equal, round, and reactive to light.   Cardiovascular:      Rate and Rhythm: Normal rate and regular rhythm.      Pulses: Normal pulses.      Heart sounds: Normal heart sounds.   Pulmonary:      Effort: " Pulmonary effort is normal.      Breath sounds: Normal breath sounds.   Musculoskeletal:         General: No swelling.      Cervical back: Neck supple.   Skin:     General: Skin is warm and dry.   Neurological:      General: No focal deficit present.      Mental Status: She is oriented to person, place, and time.         Assessment/Plan   Problem List Items Addressed This Visit       Anxiety    Relevant Medications    citalopram (CeleXA) 20 mg tablet    Controlled diabetes mellitus with peripheral circulatory disorder (CMS/HCC) - Primary     Other Visit Diagnoses       Neuropathy        Relevant Orders    Sedimentation Rate    Generalized weakness        Degeneration of cervical intervertebral disc        Relevant Orders    Referral to Pain Medicine            Check labs  Cont meds  Stable based on symptoms and exam.  Continue established treatment plan and follow up at least yearly.  Fu with neuro  Refer to pain mgmt   Reviewed cspine ct and xrays

## 2023-11-21 ASSESSMENT — ENCOUNTER SYMPTOMS
SHORTNESS OF BREATH: 0
FEVER: 0
COUGH: 0
FATIGUE: 0

## 2023-12-11 ENCOUNTER — LAB (OUTPATIENT)
Dept: LAB | Facility: LAB | Age: 73
End: 2023-12-11
Payer: MEDICARE

## 2023-12-11 DIAGNOSIS — E11.9 TYPE 2 DIABETES MELLITUS WITHOUT COMPLICATION, UNSPECIFIED WHETHER LONG TERM INSULIN USE (MULTI): ICD-10-CM

## 2023-12-11 DIAGNOSIS — E03.9 HYPOTHYROIDISM, ADULT: ICD-10-CM

## 2023-12-11 DIAGNOSIS — G62.9 NEUROPATHY: ICD-10-CM

## 2023-12-11 LAB
ALBUMIN SERPL BCP-MCNC: 4.4 G/DL (ref 3.4–5)
ALP SERPL-CCNC: 80 U/L (ref 33–136)
ALT SERPL W P-5'-P-CCNC: 20 U/L (ref 7–45)
ANION GAP SERPL CALC-SCNC: 13 MMOL/L (ref 10–20)
AST SERPL W P-5'-P-CCNC: 16 U/L (ref 9–39)
BILIRUB SERPL-MCNC: 1.3 MG/DL (ref 0–1.2)
BUN SERPL-MCNC: 11 MG/DL (ref 6–23)
CALCIUM SERPL-MCNC: 10.2 MG/DL (ref 8.6–10.3)
CHLORIDE SERPL-SCNC: 101 MMOL/L (ref 98–107)
CHOLEST SERPL-MCNC: 129 MG/DL (ref 0–199)
CHOLESTEROL/HDL RATIO: 3.5
CO2 SERPL-SCNC: 30 MMOL/L (ref 21–32)
CREAT SERPL-MCNC: 0.95 MG/DL (ref 0.5–1.05)
ERYTHROCYTE [DISTWIDTH] IN BLOOD BY AUTOMATED COUNT: 13.5 % (ref 11.5–14.5)
ERYTHROCYTE [SEDIMENTATION RATE] IN BLOOD BY WESTERGREN METHOD: 10 MM/H (ref 0–30)
EST. AVERAGE GLUCOSE BLD GHB EST-MCNC: 154 MG/DL
GFR SERPL CREATININE-BSD FRML MDRD: 64 ML/MIN/1.73M*2
GLUCOSE SERPL-MCNC: 123 MG/DL (ref 74–99)
HBA1C MFR BLD: 7 %
HCT VFR BLD AUTO: 43.2 % (ref 36–46)
HDLC SERPL-MCNC: 36.5 MG/DL
HGB BLD-MCNC: 14.1 G/DL (ref 12–16)
LDLC SERPL CALC-MCNC: 67 MG/DL
MCH RBC QN AUTO: 30.1 PG (ref 26–34)
MCHC RBC AUTO-ENTMCNC: 32.6 G/DL (ref 32–36)
MCV RBC AUTO: 92 FL (ref 80–100)
NON HDL CHOLESTEROL: 93 MG/DL (ref 0–149)
NRBC BLD-RTO: 0 /100 WBCS (ref 0–0)
PLATELET # BLD AUTO: 266 X10*3/UL (ref 150–450)
POTASSIUM SERPL-SCNC: 4.8 MMOL/L (ref 3.5–5.3)
PROT SERPL-MCNC: 6.7 G/DL (ref 6.4–8.2)
RBC # BLD AUTO: 4.69 X10*6/UL (ref 4–5.2)
SODIUM SERPL-SCNC: 139 MMOL/L (ref 136–145)
TRIGL SERPL-MCNC: 130 MG/DL (ref 0–149)
TSH SERPL-ACNC: 0.91 MIU/L (ref 0.44–3.98)
VIT B12 SERPL-MCNC: 196 PG/ML (ref 211–911)
VLDL: 26 MG/DL (ref 0–40)
WBC # BLD AUTO: 7.3 X10*3/UL (ref 4.4–11.3)

## 2023-12-11 PROCEDURE — 82607 VITAMIN B-12: CPT

## 2023-12-11 PROCEDURE — 84443 ASSAY THYROID STIM HORMONE: CPT

## 2023-12-11 PROCEDURE — 83036 HEMOGLOBIN GLYCOSYLATED A1C: CPT

## 2023-12-11 PROCEDURE — 85652 RBC SED RATE AUTOMATED: CPT

## 2023-12-11 PROCEDURE — 80061 LIPID PANEL: CPT

## 2023-12-11 PROCEDURE — 85027 COMPLETE CBC AUTOMATED: CPT

## 2023-12-11 PROCEDURE — 36415 COLL VENOUS BLD VENIPUNCTURE: CPT

## 2023-12-11 PROCEDURE — 80053 COMPREHEN METABOLIC PANEL: CPT

## 2023-12-14 ENCOUNTER — TELEPHONE (OUTPATIENT)
Dept: PRIMARY CARE | Facility: CLINIC | Age: 73
End: 2023-12-14
Payer: MEDICARE

## 2023-12-14 DIAGNOSIS — F41.9 ANXIETY: ICD-10-CM

## 2023-12-14 RX ORDER — BUSPIRONE HYDROCHLORIDE 15 MG/1
15 TABLET ORAL 2 TIMES DAILY
Qty: 180 TABLET | Refills: 3 | Status: SHIPPED | OUTPATIENT
Start: 2023-12-14 | End: 2024-12-13

## 2023-12-14 NOTE — TELEPHONE ENCOUNTER
----- Message from Thea Estes DO sent at 12/14/2023  1:58 PM EST -----  Call patient labs look pretty good  Hbaic is up a little at 7 and b12 is low want her to take otc b12 100mg daily  And work on diet

## 2023-12-14 NOTE — RESULT ENCOUNTER NOTE
Call patient labs look pretty good  Hbaic is up a little at 7 and b12 is low want her to take otc b12 100mg daily  And work on diet

## 2023-12-19 ENCOUNTER — APPOINTMENT (OUTPATIENT)
Dept: PRIMARY CARE | Facility: CLINIC | Age: 73
End: 2023-12-19
Payer: MEDICARE

## 2023-12-20 ENCOUNTER — OFFICE VISIT (OUTPATIENT)
Dept: PAIN MEDICINE | Facility: CLINIC | Age: 73
End: 2023-12-20
Payer: MEDICARE

## 2023-12-20 DIAGNOSIS — M50.30 DEGENERATION OF CERVICAL INTERVERTEBRAL DISC: ICD-10-CM

## 2023-12-20 PROCEDURE — 99203 OFFICE O/P NEW LOW 30 MIN: CPT | Performed by: PAIN MEDICINE

## 2023-12-20 PROCEDURE — 1125F AMNT PAIN NOTED PAIN PRSNT: CPT | Performed by: PAIN MEDICINE

## 2023-12-20 PROCEDURE — 3051F HG A1C>EQUAL 7.0%<8.0%: CPT | Performed by: PAIN MEDICINE

## 2023-12-20 PROCEDURE — 1159F MED LIST DOCD IN RCRD: CPT | Performed by: PAIN MEDICINE

## 2023-12-20 PROCEDURE — 1036F TOBACCO NON-USER: CPT | Performed by: PAIN MEDICINE

## 2023-12-20 PROCEDURE — 1160F RVW MEDS BY RX/DR IN RCRD: CPT | Performed by: PAIN MEDICINE

## 2023-12-20 PROCEDURE — 3048F LDL-C <100 MG/DL: CPT | Performed by: PAIN MEDICINE

## 2023-12-20 ASSESSMENT — PAIN - FUNCTIONAL ASSESSMENT: PAIN_FUNCTIONAL_ASSESSMENT: 0-10

## 2023-12-20 ASSESSMENT — PAIN SCALES - GENERAL: PAINLEVEL_OUTOF10: 6

## 2023-12-20 NOTE — PROGRESS NOTES
History Of Present Illness  Gwendolyn Puente is a 73 y.o. female with a past medical history of hypertension, diabetes with peripheral neuropathy, anxiety, presents to pain clinic as primary care referral for management of right shoulder and neck pain.  Patient states that she has had this pain after she has had 2 shoulder rotator cuff operations this past year.  Overall she states her pain is at a 2 out of 10 most days but occasionally can flareup.  She recently had a MRI of her right shoulder That showed postoperative changes of the supra and infraspinatus tendon without evidence of recurrent tear.  She does have both subscapularis and bicep tendinosis with mild degenerative changes of the AC joint.  She states she has had steroid injections in the past which have helped her in her right shoulder.  In regards to her neck pain she said her pain is very mild.  She does not endorse any radicular symptoms at this time and she has good range of motion in her neck.  She does have a history of an L1 compression fracture earlier this year that she states she is doing well and healed well from. Cervical CT scan showing multilevel degenerative changes of the cervical spine.  Right neuroforaminal narrowing at C4-5 as well as C5-6 with left neuroforaminal narrowing at C6-7, with multilevel facet arthropathy.  The pain causes significant stress in the patient's life, specifically interferes with general activity, mood, walking ability, ability to perform tasks at home and/or work.  Patient participates in physical therapy and continues to perform physician directed exercises at home. Denies any bowel or bladder incontinence, saddle anesthesia, worsening pain, weakness or falls.     Past Medical History  She has a past medical history of Abnormal radiologic findings on diagnostic imaging of unspecified kidney, COVID-19 (05/12/2022), Multiple sclerosis (CMS/[facility]) (03/03/2020), Personal history of other medical treatment, Personal  history of other medical treatment, Personal history of other medical treatment, Personal history of other medical treatment, Personal history of other medical treatment, Personal history of other medical treatment, Personal history of other medical treatment, Personal history of other medical treatment (03/25/2019), Personal history of other medical treatment (03/25/2019), and Unspecified rotator cuff tear or rupture of left shoulder, not specified as traumatic.    Surgical History  She has a past surgical history that includes Colonoscopy (11/27/2018); Cardiac catheterization (03/25/2019); Carotid endarterectomy (08/19/2018); Esophagogastroduodenoscopy (08/19/2018); Lumbar laminectomy (08/19/2018); Bladder surgery (08/19/2018); Other surgical history (08/19/2018); Other surgical history (08/19/2018); Carpal tunnel release (08/19/2018); and Hysterectomy (11/27/2018).     Social History  She reports that she has quit smoking. Her smoking use included cigarettes. She has never used smokeless tobacco. No history on file for alcohol use and drug use.    Family History  Family History   Problem Relation Name Age of Onset    Cataracts Mother      Hypothyroidism Mother      Coronary artery disease Mother      Heart attack Mother      Other (pacemaker) Mother      Macular degeneration Mother      Heart attack Father      Coronary artery disease Father      Cataracts Father      Peripheral vascular disease Father      Aortic aneurysm Father      Dementia Father          Allergies  Meperidine, Other, Simvastatin, and Sulfamethoxazole-trimethoprim    Review of Symptoms:   Constitutional: Negative for chills, diaphoresis or fever  HENT: Negative for neck swelling  Eyes:.  Negative for eye pain  Respiratory:.  Negative for cough, shortness of breath or wheezing    Cardiovascular:.  Negative for chest pain or palpitations  Gastrointestinal:.  Negative for abdominal pain, nausea and vomiting  Genitourinary:.  Negative for  urgency  Musculoskeletal:  Positive for neck pain. Positive for R shoulder pain. Denies falls within the past 3 months.  Skin: Negative for wounds or itching   Neurological: Negative for dizziness, seizures, loss of consciousness and weakness  Endo/Heme/Allergies: Does not bruise/bleed easily  Psychiatric/Behavioral: Negative for depression. The patient does not appear anxious.       PHYSICAL EXAM  Vitals signs reviewed  Constitutional:       General: Not in acute distress     Appearance: Normal appearance. Not ill-appearing.  HENT:     Head: Normocephalic and atraumatic  Eyes:     Conjunctiva/sclera: Conjunctivae normal  Cardiovascular:     Rate and Rhythm: Normal rate and regular rhythm  Pulmonary:     Effort: No respiratory distress  Abdominal:     Palpations: Abdomen is soft  Musculoskeletal: DONOVAN  Skin:     General: Skin is warm and dry  Neurological:     General: No focal deficit present  Psychiatric:         Mood and Affect: Mood normal         Behavior: Behavior normal    Advanced Exam   Inspection: No gross deformities, surgical scar well-healed  Palpation: No tenderness of patient of lumbar midline, lumbar paraspinals, bilateral SI joints  ROM: Normal range of motion of the lumbar flexion extension  Motor: 5-5 strength upper and lower extremities  Sensory: Negative for sensory abnormalities in upper and lower extremities  Negative Chau, negative Spurling  Positive Neer and Cavanaugh test on the right.  Negative rotator cuff, positive biceps tendinosis       Last Recorded Vitals  There were no vitals taken for this visit.    Relevant Results  Current Outpatient Medications   Medication Instructions    acetaminophen (TYLENOL) 1,000 mg, oral, Every 8 hours PRN    albuterol 90 mcg/actuation inhaler 1-2 puffs, inhalation, Q4-6hrs prn    amLODIPine (NORVASC) 5 mg, oral, Daily    aspirin 81 mg, oral, Daily    atorvastatin (LIPITOR) 40 mg, oral, Daily    berberine/herbal complex no.18 (BERBERINE-HERBAL COMB NO.18  ORAL) Take 1200 mg by mouth daily.    blood sugar diagnostic (OneTouch Verio test strips) strip TEST 3 TIMES DAILY.    busPIRone (BUSPAR) 15 mg, oral, 2 times daily    carvedilol (Coreg) 6.25 mg tablet 1 tablet, oral, 2 times daily    cholecalciferol (VITAMIN D-3) 100 mcg, oral, Daily RT    citalopram (CELEXA) 30 mg, oral, Daily    coenzyme Q-10 100 mg, oral    diazePAM (VALIUM) 5 mg, oral, 3 times daily PRN    docusate sodium (COLACE) 100 mg, oral, 2 times daily    ezetimibe (ZETIA) 10 mg, oral, Daily RT    ipratropium (Atrovent) 21 mcg (0.03 %) nasal spray 2 sprays, Each Nostril, 2 times daily    lancets 33 g, miscellaneous, 3 times daily    levothyroxine (Synthroid, Levoxyl) 112 mcg tablet Take 1/2 tab one day a week and 1 tab all other days    lidocaine (Salonpas, lidocaine,) 4 % patch 1 patch, transdermal, Daily, Remove & discard patch within 12 hours or as directed by MD.    meclizine (ANTIVERT) 25 mg, oral, 3 times daily PRN    omega-3 fatty acids-fish oil (Fish OiL Pearls) 300-400 mg capsule 300 mg, oral    omeprazole (PRILOSEC) 40 mg, oral, Daily    polyethylene glycol (GLYCOLAX, MIRALAX) 17 g, oral, Daily    TURMERIC ORAL Take 1600 mg by mouth daily.       No results found for this or any previous visit from the past 1000 days.     No image results found.       1. Degeneration of cervical intervertebral disc  Referral to Pain Medicine           ASSESSMENT/PLAN  Gwendolyn Puente is a 73 y.o. female with a past medical history of hypertension, diabetes with peripheral neuropathy, anxiety, presents to pain clinic as primary care referral for management of right shoulder and neck pain. She recently had a MRI of her right shoulder that showed postoperative changes of the supra and infraspinatus tendon without evidence of recurrent tear.  She does have both subscapularis and bicep tendinosis with mild degenerative changes of the AC joint.  Patient likely has multifactorial component of her right shoulder plain  including components of biceps tendinosis as well as AC joint, and rotator cuff pathology.  At this time patient's neck and shoulder pain are very mild at a 2 out of 10 and she would not like any interventions or medication changes done.  We discussed the role physical therapy and its importance in preventing adhesive capsulitis and further sequela.  Patient expressed understanding.  Patient would not like injection therapy in her shoulder at this time, however if pain worsens she would like to follow-up if needed        aSlvador Burnett MD

## 2024-01-24 ENCOUNTER — APPOINTMENT (OUTPATIENT)
Dept: NEUROLOGY | Facility: CLINIC | Age: 74
End: 2024-01-24
Payer: MEDICARE

## 2024-02-01 RX ORDER — CITALOPRAM 20 MG/1
30 TABLET, FILM COATED ORAL DAILY
Qty: 135 TABLET | Refills: 3 | Status: SHIPPED | OUTPATIENT
Start: 2024-02-01

## 2024-02-28 ENCOUNTER — APPOINTMENT (OUTPATIENT)
Dept: PRIMARY CARE | Facility: CLINIC | Age: 74
End: 2024-02-28
Payer: MEDICARE

## 2025-01-18 DIAGNOSIS — F41.9 ANXIETY: ICD-10-CM

## 2025-01-20 RX ORDER — CITALOPRAM 20 MG/1
TABLET, FILM COATED ORAL
Qty: 135 TABLET | Refills: 3 | Status: SHIPPED | OUTPATIENT
Start: 2025-01-20

## 2025-04-13 DIAGNOSIS — F41.9 ANXIETY: ICD-10-CM

## 2025-04-14 RX ORDER — BUSPIRONE HYDROCHLORIDE 15 MG/1
15 TABLET ORAL 2 TIMES DAILY
Qty: 180 TABLET | Refills: 3 | Status: SHIPPED | OUTPATIENT
Start: 2025-04-14